# Patient Record
Sex: MALE | Race: WHITE | NOT HISPANIC OR LATINO | Employment: OTHER | URBAN - NONMETROPOLITAN AREA
[De-identification: names, ages, dates, MRNs, and addresses within clinical notes are randomized per-mention and may not be internally consistent; named-entity substitution may affect disease eponyms.]

---

## 2019-05-05 ENCOUNTER — HOSPITAL ENCOUNTER (INPATIENT)
Facility: OTHER | Age: 69
LOS: 2 days | Discharge: HOME | DRG: 920 | End: 2019-05-07
Attending: FAMILY MEDICINE | Admitting: FAMILY MEDICINE
Payer: COMMERCIAL

## 2019-05-05 DIAGNOSIS — I10 ESSENTIAL HYPERTENSION, BENIGN: Primary | ICD-10-CM

## 2019-05-05 DIAGNOSIS — K62.5 RECTAL BLEEDING: ICD-10-CM

## 2019-05-05 DIAGNOSIS — Z79.01 LONG TERM (CURRENT) USE OF ANTICOAGULANTS: ICD-10-CM

## 2019-05-05 DIAGNOSIS — I48.20 CHRONIC ATRIAL FIBRILLATION (H): ICD-10-CM

## 2019-05-05 DIAGNOSIS — K92.2 ACUTE GASTROINTESTINAL HEMORRHAGE: ICD-10-CM

## 2019-05-05 DIAGNOSIS — Z79.01 ANTICOAGULATED ON COUMADIN: ICD-10-CM

## 2019-05-05 PROBLEM — D62 ANEMIA DUE TO BLOOD LOSS, ACUTE: Status: ACTIVE | Noted: 2019-05-05

## 2019-05-05 LAB
ABO + RH BLD: NORMAL
ABO + RH BLD: NORMAL
ALBUMIN SERPL-MCNC: 3.9 G/DL (ref 3.5–5.7)
ALP SERPL-CCNC: 47 U/L (ref 34–104)
ALT SERPL W P-5'-P-CCNC: 14 U/L (ref 7–52)
ANION GAP SERPL CALCULATED.3IONS-SCNC: 1 MMOL/L (ref 3–14)
ANION GAP SERPL CALCULATED.3IONS-SCNC: 4 MMOL/L (ref 3–14)
APTT PPP: 35 SEC (ref 29–50)
AST SERPL W P-5'-P-CCNC: 14 U/L (ref 13–39)
BASOPHILS # BLD AUTO: 0.1 10E9/L (ref 0–0.2)
BASOPHILS NFR BLD AUTO: 0.7 %
BILIRUB SERPL-MCNC: 0.7 MG/DL (ref 0.3–1)
BLD GP AB SCN SERPL QL: NORMAL
BLD PROD TYP BPU: NORMAL
BLD PROD TYP BPU: NORMAL
BLD UNIT ID BPU: 0
BLOOD BANK CMNT PATIENT-IMP: NORMAL
BLOOD PRODUCT CODE: NORMAL
BPU ID: NORMAL
BUN SERPL-MCNC: 13 MG/DL (ref 7–25)
BUN SERPL-MCNC: 14 MG/DL (ref 7–25)
CALCIUM SERPL-MCNC: 7.2 MG/DL (ref 8.6–10.3)
CALCIUM SERPL-MCNC: 9 MG/DL (ref 8.6–10.3)
CHLORIDE SERPL-SCNC: 103 MMOL/L (ref 98–107)
CHLORIDE SERPL-SCNC: 113 MMOL/L (ref 98–107)
CO2 SERPL-SCNC: 23 MMOL/L (ref 21–31)
CO2 SERPL-SCNC: 25 MMOL/L (ref 21–31)
CREAT SERPL-MCNC: 0.66 MG/DL (ref 0.7–1.3)
CREAT SERPL-MCNC: 0.73 MG/DL (ref 0.7–1.3)
DIFFERENTIAL METHOD BLD: NORMAL
EOSINOPHIL # BLD AUTO: 0.2 10E9/L (ref 0–0.7)
EOSINOPHIL NFR BLD AUTO: 2.5 %
ERYTHROCYTE [DISTWIDTH] IN BLOOD BY AUTOMATED COUNT: 13.6 % (ref 10–15)
GFR SERPL CREATININE-BSD FRML MDRD: >90 ML/MIN/{1.73_M2}
GFR SERPL CREATININE-BSD FRML MDRD: >90 ML/MIN/{1.73_M2}
GLUCOSE SERPL-MCNC: 119 MG/DL (ref 70–105)
GLUCOSE SERPL-MCNC: 148 MG/DL (ref 70–105)
HCT VFR BLD AUTO: 42.2 % (ref 40–53)
HEMOCCULT STL QL: POSITIVE
HGB BLD-MCNC: 11.4 G/DL (ref 13.3–17.7)
HGB BLD-MCNC: 14.2 G/DL (ref 13.3–17.7)
HGB BLD-MCNC: 8.2 G/DL (ref 13.3–17.7)
HGB BLD-MCNC: 9.6 G/DL (ref 13.3–17.7)
HGB BLD-MCNC: 9.7 G/DL (ref 13.3–17.7)
IMM GRANULOCYTES # BLD: 0 10E9/L (ref 0–0.4)
IMM GRANULOCYTES NFR BLD: 0.3 %
INR PPP: 1.66 (ref 0–1.3)
LYMPHOCYTES # BLD AUTO: 2 10E9/L (ref 0.8–5.3)
LYMPHOCYTES NFR BLD AUTO: 28 %
MCH RBC QN AUTO: 32.1 PG (ref 26.5–33)
MCHC RBC AUTO-ENTMCNC: 33.6 G/DL (ref 31.5–36.5)
MCV RBC AUTO: 96 FL (ref 78–100)
MONOCYTES # BLD AUTO: 0.7 10E9/L (ref 0–1.3)
MONOCYTES NFR BLD AUTO: 10.1 %
NEUTROPHILS # BLD AUTO: 4.2 10E9/L (ref 1.6–8.3)
NEUTROPHILS NFR BLD AUTO: 58.4 %
NUM BPU REQUESTED: 2
PLATELET # BLD AUTO: 184 10E9/L (ref 150–450)
POTASSIUM SERPL-SCNC: 3.9 MMOL/L (ref 3.5–5.1)
POTASSIUM SERPL-SCNC: 4.2 MMOL/L (ref 3.5–5.1)
PROT SERPL-MCNC: 6.2 G/DL (ref 6.4–8.9)
RBC # BLD AUTO: 4.42 10E12/L (ref 4.4–5.9)
SODIUM SERPL-SCNC: 132 MMOL/L (ref 134–144)
SODIUM SERPL-SCNC: 137 MMOL/L (ref 134–144)
SPECIMEN EXP DATE BLD: NORMAL
TRANSFUSION STATUS PATIENT QL: NORMAL
TRANSFUSION STATUS PATIENT QL: NORMAL
WBC # BLD AUTO: 7.1 10E9/L (ref 4–11)

## 2019-05-05 PROCEDURE — 99285 EMERGENCY DEPT VISIT HI MDM: CPT | Mod: Z6 | Performed by: FAMILY MEDICINE

## 2019-05-05 PROCEDURE — 36415 COLL VENOUS BLD VENIPUNCTURE: CPT | Performed by: FAMILY MEDICINE

## 2019-05-05 PROCEDURE — 99285 EMERGENCY DEPT VISIT HI MDM: CPT | Mod: 25 | Performed by: FAMILY MEDICINE

## 2019-05-05 PROCEDURE — 99222 1ST HOSP IP/OBS MODERATE 55: CPT | Mod: AI | Performed by: FAMILY MEDICINE

## 2019-05-05 PROCEDURE — 80048 BASIC METABOLIC PNL TOTAL CA: CPT | Performed by: FAMILY MEDICINE

## 2019-05-05 PROCEDURE — 12000000 ZZH R&B MED SURG/OB

## 2019-05-05 PROCEDURE — 86901 BLOOD TYPING SEROLOGIC RH(D): CPT | Performed by: FAMILY MEDICINE

## 2019-05-05 PROCEDURE — C9113 INJ PANTOPRAZOLE SODIUM, VIA: HCPCS | Performed by: FAMILY MEDICINE

## 2019-05-05 PROCEDURE — 93010 ELECTROCARDIOGRAM REPORT: CPT | Performed by: INTERNAL MEDICINE

## 2019-05-05 PROCEDURE — 85610 PROTHROMBIN TIME: CPT | Performed by: FAMILY MEDICINE

## 2019-05-05 PROCEDURE — 86900 BLOOD TYPING SEROLOGIC ABO: CPT | Performed by: FAMILY MEDICINE

## 2019-05-05 PROCEDURE — 86850 RBC ANTIBODY SCREEN: CPT | Performed by: FAMILY MEDICINE

## 2019-05-05 PROCEDURE — 96361 HYDRATE IV INFUSION ADD-ON: CPT | Performed by: FAMILY MEDICINE

## 2019-05-05 PROCEDURE — 30233N1 TRANSFUSION OF NONAUTOLOGOUS RED BLOOD CELLS INTO PERIPHERAL VEIN, PERCUTANEOUS APPROACH: ICD-10-PCS | Performed by: FAMILY MEDICINE

## 2019-05-05 PROCEDURE — 82272 OCCULT BLD FECES 1-3 TESTS: CPT | Performed by: FAMILY MEDICINE

## 2019-05-05 PROCEDURE — 96374 THER/PROPH/DIAG INJ IV PUSH: CPT | Performed by: FAMILY MEDICINE

## 2019-05-05 PROCEDURE — 25000128 H RX IP 250 OP 636: Performed by: FAMILY MEDICINE

## 2019-05-05 PROCEDURE — P9016 RBC LEUKOCYTES REDUCED: HCPCS | Performed by: FAMILY MEDICINE

## 2019-05-05 PROCEDURE — 25800030 ZZH RX IP 258 OP 636: Performed by: FAMILY MEDICINE

## 2019-05-05 PROCEDURE — 80053 COMPREHEN METABOLIC PANEL: CPT | Performed by: FAMILY MEDICINE

## 2019-05-05 PROCEDURE — 96375 TX/PRO/DX INJ NEW DRUG ADDON: CPT | Performed by: FAMILY MEDICINE

## 2019-05-05 PROCEDURE — 86920 COMPATIBILITY TEST SPIN: CPT | Mod: 91 | Performed by: FAMILY MEDICINE

## 2019-05-05 PROCEDURE — 93005 ELECTROCARDIOGRAM TRACING: CPT | Performed by: FAMILY MEDICINE

## 2019-05-05 PROCEDURE — 85025 COMPLETE CBC W/AUTO DIFF WBC: CPT | Performed by: FAMILY MEDICINE

## 2019-05-05 PROCEDURE — G0378 HOSPITAL OBSERVATION PER HR: HCPCS

## 2019-05-05 PROCEDURE — 85018 HEMOGLOBIN: CPT | Performed by: FAMILY MEDICINE

## 2019-05-05 PROCEDURE — 85730 THROMBOPLASTIN TIME PARTIAL: CPT | Performed by: FAMILY MEDICINE

## 2019-05-05 PROCEDURE — 86920 COMPATIBILITY TEST SPIN: CPT | Performed by: FAMILY MEDICINE

## 2019-05-05 RX ORDER — WARFARIN SODIUM 5 MG/1
TABLET ORAL
Status: ON HOLD | COMMUNITY
Start: 2018-09-13 | End: 2019-05-07

## 2019-05-05 RX ORDER — ONDANSETRON 4 MG/1
4 TABLET, ORALLY DISINTEGRATING ORAL EVERY 6 HOURS PRN
Status: DISCONTINUED | OUTPATIENT
Start: 2019-05-05 | End: 2019-05-07 | Stop reason: HOSPADM

## 2019-05-05 RX ORDER — ACETAMINOPHEN 650 MG/1
650 SUPPOSITORY RECTAL EVERY 4 HOURS PRN
Status: DISCONTINUED | OUTPATIENT
Start: 2019-05-05 | End: 2019-05-06

## 2019-05-05 RX ORDER — ACETAMINOPHEN 325 MG/1
650 TABLET ORAL EVERY 4 HOURS PRN
Status: DISCONTINUED | OUTPATIENT
Start: 2019-05-05 | End: 2019-05-07 | Stop reason: HOSPADM

## 2019-05-05 RX ORDER — NALOXONE HYDROCHLORIDE 0.4 MG/ML
.1-.4 INJECTION, SOLUTION INTRAMUSCULAR; INTRAVENOUS; SUBCUTANEOUS
Status: DISCONTINUED | OUTPATIENT
Start: 2019-05-05 | End: 2019-05-06 | Stop reason: CLARIF

## 2019-05-05 RX ORDER — SIMVASTATIN 20 MG
20 TABLET ORAL AT BEDTIME
COMMUNITY
Start: 2018-07-26 | End: 2024-02-15

## 2019-05-05 RX ORDER — CARVEDILOL 3.12 MG/1
3.12 TABLET ORAL 2 TIMES DAILY WITH MEALS
Status: DISCONTINUED | OUTPATIENT
Start: 2019-05-05 | End: 2019-05-05

## 2019-05-05 RX ORDER — SODIUM CHLORIDE 9 MG/ML
INJECTION, SOLUTION INTRAVENOUS CONTINUOUS
Status: DISCONTINUED | OUTPATIENT
Start: 2019-05-05 | End: 2019-05-06

## 2019-05-05 RX ORDER — SILDENAFIL 50 MG/1
50 TABLET, FILM COATED ORAL
Status: ON HOLD | COMMUNITY
Start: 2015-02-13 | End: 2019-05-05

## 2019-05-05 RX ORDER — ONDANSETRON 2 MG/ML
4 INJECTION INTRAMUSCULAR; INTRAVENOUS EVERY 6 HOURS PRN
Status: DISCONTINUED | OUTPATIENT
Start: 2019-05-05 | End: 2019-05-07 | Stop reason: HOSPADM

## 2019-05-05 RX ORDER — LISINOPRIL 10 MG/1
TABLET ORAL
Status: ON HOLD | COMMUNITY
Start: 2018-09-03 | End: 2019-05-07

## 2019-05-05 RX ORDER — CARVEDILOL 3.12 MG/1
TABLET ORAL
Status: ON HOLD | COMMUNITY
Start: 2019-02-27 | End: 2019-05-07

## 2019-05-05 RX ADMIN — SODIUM CHLORIDE 1000 ML: 9 INJECTION, SOLUTION INTRAVENOUS at 06:56

## 2019-05-05 RX ADMIN — SODIUM CHLORIDE: 9 INJECTION, SOLUTION INTRAVENOUS at 20:35

## 2019-05-05 RX ADMIN — SODIUM CHLORIDE 1000 ML: 9 INJECTION, SOLUTION INTRAVENOUS at 18:16

## 2019-05-05 RX ADMIN — SODIUM CHLORIDE: 9 INJECTION, SOLUTION INTRAVENOUS at 11:38

## 2019-05-05 RX ADMIN — SODIUM CHLORIDE 500 ML: 9 INJECTION, SOLUTION INTRAVENOUS at 16:30

## 2019-05-05 RX ADMIN — PANTOPRAZOLE SODIUM 40 MG: 40 INJECTION, POWDER, FOR SOLUTION INTRAVENOUS at 06:56

## 2019-05-05 RX ADMIN — SODIUM CHLORIDE: 9 INJECTION, SOLUTION INTRAVENOUS at 15:30

## 2019-05-05 ASSESSMENT — ENCOUNTER SYMPTOMS
LIGHT-HEADEDNESS: 1
VOMITING: 0
ABDOMINAL PAIN: 0
HEMATURIA: 0
BACK PAIN: 0
SORE THROAT: 0
DYSURIA: 0
SHORTNESS OF BREATH: 0
FEVER: 0
NAUSEA: 0
FATIGUE: 0
COUGH: 0
DIARRHEA: 0
ANAL BLEEDING: 1
STRIDOR: 0
PALPITATIONS: 0
BLOOD IN STOOL: 1
BRUISES/BLEEDS EASILY: 1

## 2019-05-05 ASSESSMENT — MIFFLIN-ST. JEOR: SCORE: 1760.55

## 2019-05-05 NOTE — ED NOTES
Up to bathroom, had small stool with BRB in toilet. Steady with ambulation, lying in bed. Provider updated. Martha Mccarty RN on 5/5/2019 at 7:58 AM

## 2019-05-05 NOTE — PROGRESS NOTES
Chart accessed pending admission to [facility]. Patient assigned to room 334 with caregiver Isaiah Cheri Ospina RN on 5/5/2019 at 8:54 AM

## 2019-05-05 NOTE — H&P
[facility]    History and Physical  Hospitalist       Date of Admission:  5/5/2019    Assessment & Plan   Juan C Troncoso is a 69 year old male anticoagulated for a-fib who presents with lower GI bleeding following colonoscopy.    Principal Problem:    Acute lower GI bleeding    Assessment: colonoscopy on 4/29, back on warfarin and aspirin since.     Plan: Observation status at this time pending future hemoglobin and frequency of BM   Serial hemoglobin every 4 hours given frequency of stools   Avoid vitamin K for now, but if significant bleeding, then may use   IVF support    Active Problems:    Atrial fibrillation (H)    Assessment: chronic, rate controlled and anti-coagulated. INR 1.66    Plan: holding warfarin   Reduced carvedilol dose in AM, taking 3.125 mg BID and holding lisinopril in case of hypotension with blood loss      Cardiomyopathy (H)    Assessment: EF on ECHO through [facility] in Nov 2013 was normal        DVT Prophylaxis: Low Risk/Ambulatory with no VTE prophylaxis indicated  Code Status: No Order    Ignacio Rincon    Primary Care Physician   Gin Solitario    Chief Complaint   Bloody stools    History is obtained from the patient and chart review.    History of Present Illness   Juan C Troncoso is a 69 year old male on aspirin and warfarin who presents with bloody bowel movements today.  He had a colonoscopy with Dr. Layton on April 29, which was 6 days ago.  He started back on his warfarin and aspirin after the procedure.  Has been doing well until this morning when he had right blood in his stool.  He has now had 7 bloody bowel movements between home, the ER, and on admission to the hospital.  His last bowel movement was only blood, no stool.  He is starting to feel little lightheaded.  Hemoglobin in the ER was normal at 14.2. INR 1.66. Denies any chest pain or shortness of breath.  Generally health has been stable.  He was feeling well up until the bleeding started.    Past  Medical History    I have reviewed this patient's medical history and updated it with pertinent information if needed.   Past Medical History:   Diagnosis Date     Alcoholic cardiomyopathy (H)     Cardioverted.  Subsequently started on Amiodarone, Coumadin and Lisinopril.  Due for repeat echocardiography and cardiology follow-up 9/05.     Atrial fibrillation (H)     Atrial fibrillation possibly related to alcoholic cardiomyopathy.  Had a consultation with Dr. Henriquez on 02/28/05 and was advised to totally abstain from alcohol consumption.     Chest pain     6/05,Hospitalization with chest pain 6/05.  Left [facility] against medical advice.  Subsequently followed up with cardiology at [facility].  Underwent coronary angiography revealing no evidence of coronary artery stenosis.     Disorder of thyroid     Thyroid hormone studies normal.     Other personal history presenting hazards to health     revealed minimal left ventricular hypertrophy without valvular disease.     Tobacco use disorder     Dr. Henriquez advised smoking cessation consideration of a stress electrocardiogram because of a family history of coronary disease and longstanding history of cigarette abuse.       Past Surgical History   I have reviewed this patient's surgical history and updated it with pertinent information if needed.  Past Surgical History:   Procedure Laterality Date     ECHOCARDIOGRAM INTRAOPERATIVE IN OR      revealed minimal left ventricular hypertrophy without valvular disease.     OTHER SURGICAL HISTORY      208850,CHEST TUBE INSERTION,Previous chest tube placement     TONSILLECTOMY      No Comments Provided       Prior to Admission Medications   Prior to Admission Medications   Prescriptions Last Dose Informant Patient Reported? Taking?   aspirin (ASPIRIN) 81 MG EC tablet 5/4/2019 at am Self Yes Yes   Sig: Take 81 mg by mouth daily    carvedilol (COREG) 3.125 MG tablet 5/4/2019 at pm Self Yes Yes   Sig: TAKE 2  "TABLETS IN THE MORNING  AND TAKE 1 TABLET IN THE EVENING   lisinopril (PRINIVIL/ZESTRIL) 10 MG tablet 2019 at am Self Yes Yes   Sig: TAKE 1 TABLET EVERY DAY   simvastatin (ZOCOR) 20 MG tablet 2019 at pm Self Yes Yes   Sig: Take 20 mg by mouth At Bedtime    warfarin (COUMADIN) 5 MG tablet 2019 at am Self Yes Yes   Sig: Take 2 tablets on Tuesday and Saturday and take 1 1/2 tablets all the rest of the days of the week.      Facility-Administered Medications: None     Allergies   No Known Allergies    Social History   I have reviewed this patient's social history and updated it with pertinent information if needed. Juan C Troncoso      Family History   I have reviewed this patient's family history and updated it with pertinent information if needed.   Family History   Problem Relation Age of Onset     Other - See Comments Mother         COPD     Family History Negative Father         Good Health,A & W @ 78     Heart Disease Other         Heart Disease,Was an Olympic runner and had \"athlete's heart.\"  He apparently  of heart problems at a young age.     Cancer No family hx of         Cancer       Review of Systems     REVIEW OF SYSTEMS:    Eyes: Denies problems  Ears/Nose/Throat: Denies problems  Respiratory: Denies problems  Genitourinary: Denies problems  Musculoskeletal: Denies problems  Skin: Denies problems  Neurologic: Denies problems  Psychiatric: Denies problems      Physical Exam   Temp: 98.4  F (36.9  C) Temp src: Tympanic BP: 114/71 Pulse: 83 Heart Rate: 76 Resp: 16 SpO2: 95 % O2 Device: None (Room air)    Vital Signs with Ranges  Temp:  [96.2  F (35.7  C)-98.4  F (36.9  C)] 98.4  F (36.9  C)  Pulse:  [] 83  Heart Rate:  [60-81] 76  Resp:  [13-20] 16  BP: (114-184)/() 114/71  SpO2:  [94 %-99 %] 95 %  210 lbs 0 oz    General Appearance: Pleasant, alert, appropriate appearance for age. No acute distress  Eyes: EOMI, PERRL, no conjunctival injection  OroPharynx Exam: Normal " pharynx.  Neck Exam: Supple, no masses or nodes.  Chest/Respiratory Exam: Normal chest wall and respirations. Clear to auscultation.  Cardiovascular Exam: Regular rate and rhythm. S1, S2, no murmur, click, gallop, or rubs.  Gastrointestinal Exam: Soft, nontender, no abnormal masses or organomegaly.  Extremities: 2+ pedal pulses.  No lower extremity edema.  Neuro Exam: Alert, oriented x 3. CN II-XI intact. Strength symmetric upper and lower extremities  Psychiatric: Normal affect and mentation    Data   Data reviewed today:  I personally reviewed the EKG tracing showing a-fib controlled rate, no concerning ST changes.  Recent Labs   Lab 05/05/19  1141 05/05/19  0650   WBC  --  7.1   HGB 11.4* 14.2   MCV  --  96   PLT  --  184   INR  --  1.66*   NA  --  132*   POTASSIUM  --  3.9   CHLORIDE  --  103   CO2  --  25   BUN  --  14   CR  --  0.73   ANIONGAP  --  4   NOMAN  --  9.0   GLC  --  148*   ALBUMIN  --  3.9   PROTTOTAL  --  6.2*   BILITOTAL  --  0.7   ALKPHOS  --  47   ALT  --  14   AST  --  14       No results found for this or any previous visit (from the past 24 hour(s)).

## 2019-05-05 NOTE — PROGRESS NOTES
Pt reports feeling very dizzy and faint when returning to bed after last BM. Stools continue to be bloody. Will notify MD of pt's condition.

## 2019-05-05 NOTE — PROGRESS NOTES
Pt alert and orientated. SBA with transfers. Using bedside commode due to being dizzy. 's-140's. HR 70's. Afebrile. Lung sounds clear. Pt has had 5 bloody stools since admission to Medical floor, total of 11 BM since this AM. Two 500 ml bolus given for dizziness. Pt reports improvement in symptoms after interventions. Last Hgb 9.6 MD aware, decrease from 14.2 in ER. Reports no problems with voiding. Left PIV  ml/hr.

## 2019-05-05 NOTE — PHARMACY-ADMISSION MEDICATION HISTORY
Pharmacy -- Admission Medication Reconciliation    Prior to admission (PTA) medications were reviewed and the patient's PTA medication list was updated.    Sources Consulted: Patient, Care Everywhere  [facility] closed at time of note    The reliability of this Medication Reconciliation is: Reliability: Borderline reliable    The following significant changes were made:  1. Removed Viagra - per patient    Recent warfarin dosing per patient:  Patient states he HELD his warfarin prior to his colonoscopy, then he restarted with increased dosing on 4/29 and 5/1, then he resumed his usual dosing of 10 mg on Tuesday and Saturdays then 7.5 mg rest of the week.     4/29/19: 12.5 mg  4/30/10: 12.5 mg  5/1/19: 7.5 mg  5/2/19: 7.5 mg  5/3/19: 7.5 mg  5/4/19: 10 mg      In addition, the patient's allergies were reviewed with the patient and updated as follows:   Allergies: Patient has no known allergies.    The pharmacist has reviewed with the patient that all personal medications should be removed from the building or locked in the belongings safe.  Patient shall only take medications ordered by the physician and administered by the nursing staff.     Medication barriers identified: Patient needed significant prompting during interview.    Medication adherence concerns: NA   Understanding of emergency medications: NA    Ellie Scott RPH, 5/5/2019,  4:28 PM     Verified refill history with [facility]; no significant discrepancies; however simvastatin has not been filled since October 2018. Unable to verify if patient has been taking this.     Added route to carvedilol, lisinopril, and warfarin.    [facility] refill history also pending (no records showing up in [employer]).  Pa Fajardo RPH on 5/7/2019 at 9:15 AM

## 2019-05-05 NOTE — ED NOTES
Patient watching observation status video and handout given. Martha Mccarty RN on 5/5/2019 at 8:57 AM

## 2019-05-05 NOTE — ED TRIAGE NOTES
"Pt reports 6 days ago he had a colonoscopy and had a few polyps removed. He is on Warfarin and Aspirin. He had a BM this morning and found blood in the toilet, says the toilet was really red when he looked at it. He has had daily morning BMs since the colonoscopy without issue. He also says he feels dizzy and \"woozy\".  "

## 2019-05-05 NOTE — ED PROVIDER NOTES
History     Chief Complaint   Patient presents with     Rectal Bleeding     HPI  Juan C Troncoso is a 69 year old male who presents to ED with rectal bleeding that started this morning.   He states that he woke up this morning and had a bowel movement.  He noticed that there was a lot of bright red blood in the toilet water and on the tissue paper with wiping.  He states that he had a second bowel movement about an hour later and the same thing occurred.  He denies any diarrhea, melena, passing of clots.  He is not bleeding without having a bowel movement.  He denies any abdominal pain, nausea, vomiting.  He does state that he feels lightheaded and woozy.  He has not had any syncopal or near syncopal episodes.    Patient denies any fevers, sweats, chills, URI type symptoms, sore throat, cough, chest pain, shortness of breath, dysuria or hematuria.  He is on Coumadin.  His INRs have been therapeutic.  He states that he had a colonoscopy 6 days ago and had stopped his Coumadin for that but started it afterwards.    Allergies:  No Known Allergies    Problem List:    There are no active problems to display for this patient.       Past Medical History:    Past Medical History:   Diagnosis Date     Alcoholic cardiomyopathy (H)      Atrial fibrillation (H)      Chest pain      Disorder of thyroid      Other personal history presenting hazards to health      Tobacco use disorder        Past Surgical History:    Past Surgical History:   Procedure Laterality Date     ECHOCARDIOGRAM INTRAOPERATIVE IN OR      revealed minimal left ventricular hypertrophy without valvular disease.     OTHER SURGICAL HISTORY      208850,CHEST TUBE INSERTION,Previous chest tube placement     TONSILLECTOMY      No Comments Provided       Family History:    Family History   Problem Relation Age of Onset     Other - See Comments Mother         COPD     Family History Negative Father         Good Health,A & W @ 78     Heart Disease Other          "Heart Disease,Was an Olympic runner and had \"athlete's heart.\"  He apparently  of heart problems at a young age.     Cancer No family hx of         Cancer       Social History:  Marital Status:   [2]  Social History     Tobacco Use     Smoking status: Not on file   Substance Use Topics     Alcohol use: Not on file     Drug use: Not on file        Medications:      aspirin (ASPIRIN) 81 MG EC tablet   carvedilol (COREG) 3.125 MG tablet   lisinopril (PRINIVIL/ZESTRIL) 10 MG tablet   simvastatin (ZOCOR) 20 MG tablet   warfarin (COUMADIN) 5 MG tablet   sildenafil (VIAGRA) 50 MG tablet         Review of Systems   Constitutional: Negative for fatigue and fever.   HENT: Negative for congestion and sore throat.    Eyes: Negative for visual disturbance.   Respiratory: Negative for cough, shortness of breath and stridor.    Cardiovascular: Negative for chest pain, palpitations and leg swelling.   Gastrointestinal: Positive for anal bleeding and blood in stool. Negative for abdominal pain, diarrhea, nausea and vomiting.   Genitourinary: Negative for dysuria and hematuria.   Musculoskeletal: Negative for back pain.   Skin: Negative for pallor.   Neurological: Positive for light-headedness. Negative for syncope.   Hematological: Bruises/bleeds easily.   All other systems reviewed and are negative.      Physical Exam   BP: (!) 184/118  Pulse: 100  Heart Rate: 81  Temp: 96.2  F (35.7  C)  Resp: 16  Height: 182.9 cm (6')  Weight: 95.3 kg (210 lb)  SpO2: 95 %      Physical Exam   Constitutional: He is oriented to person, place, and time. He appears well-developed and well-nourished. He is cooperative. He does not appear ill. No distress.   HENT:   Head: Normocephalic and atraumatic.   Right Ear: External ear normal.   Left Ear: External ear normal.   Nose: Nose normal.   Mouth/Throat: Oropharynx is clear and moist.   Eyes: Pupils are equal, round, and reactive to light. Conjunctivae and EOM are normal.   Neck: Normal range " of motion. Neck supple.   Cardiovascular: Normal rate, regular rhythm, normal heart sounds and intact distal pulses.   No murmur heard.  Pulmonary/Chest: Effort normal and breath sounds normal. He has no rales.   Abdominal: Soft. Bowel sounds are normal. He exhibits no distension. There is no hepatosplenomegaly. There is no tenderness. There is no rigidity, no rebound, no guarding, no CVA tenderness, no tenderness at McBurney's point and negative Casey's sign.   Genitourinary: Prostate normal. Rectal exam shows external hemorrhoid and guaiac positive stool. Rectal exam shows no tenderness.   Genitourinary Comments: Андрей Mistry RN is present in the room for the rectal examination.    There is a small amount of gross bright red blood present on my examination.  There are some external hemorrhoids that are not thrombosed.   Musculoskeletal: Normal range of motion. He exhibits no edema or tenderness.   Lymphadenopathy:     He has no cervical adenopathy.   Neurological: He is alert and oriented to person, place, and time.   Skin: Skin is warm. No rash noted. He is not diaphoretic.   Nursing note and vitals reviewed.    PATHOLOGY SPEC (04/29/2019 12:02 PM CDT)  PATHOLOGY SPEC (04/29/2019 12:02 PM CDT)   Component Value Ref Range Performed At Pathologist Signature   Case Report Surgical Pathology Report                         Case: [id number]                                 Authorizing Provider:  Maged Layton MD        Collected:           04/29/2019 1202              Ordering Location:     [facility]     Received:            04/29/2019 1927              Pathologist:           France Escobar MD                                                         Specimens:   A) - Large Intestine Right/Ascending Colon, Ascending colon (proximal)                              B) - Large Intestine Right/Ascending Colon, Mid ascending colon                                     C) - Large Intestine Transverse Colon,  "Polyp @ proximal transverse @ hepatic flexure                D) - Large Intestine Sigmoid Colon, Polyp @ sigmoid colon @ 20cm                        [facility]     Final Dx A. Ascending colon, proximal polyp, biopsy:  Fragments of tubular adenoma  B. Mid ascending colon, polyp, biopsy:  Tubular adenoma  C. Proximal transverse colon at hepatic flexure, polyp, biopsy:  Fragments of tubular adenoma  D. Sigmoid colon, polyp at 20 cm, biopsy:  Hyperplastic polyp   [facility] Electronically signed by France Escobar MD on 5/1/2019 at 11:53 AM   Gross Description Received are four containers, specimens in formalin, labeled with proper patient identification.   A. Designated \"ascending colon proximal\" are 3 pieces of tan soft tissue, each 0.2 cm. Entirely submitted in A1.  B. Designated \"midascending colon\" is a 0.3 cm piece of pink-tan soft tissue, which is entirely submitted in B1.  C. Designated \"polyp at proximal transverse at hepatic flexure\" are approximately 3 pieces of tan soft tissue, each 0.1 cm. Entirely submitted in C1.  D. Designated \"polyp at sigmoid colon at 20 cm\" is a 0.3 cm piece of pink-tan polyploid tissue, which is admixed with debris and entirely submitted in D1.  MJG   [facility]     Microscopic Microscopic examination performed.    [facility]       PATHOLOGY SPEC (04/29/2019 12:02 PM CDT)   Specimen   Tissue - Large Intestine Right/Ascending Colon     PATHOLOGY SPEC (04/29/2019 12:02 PM CDT)   Performing Organization Address City/State/Zipcode Phone Number   [facility]   [address] [place] [address]            ED Course     Orders Placed This Encounter   Procedures     CBC with platelets differential     Comprehensive metabolic panel     INR     Partial thromboplastin time     Occult blood stool     EKG 12-lead, tracing only     Cardiac Continuous Monitoring     Peripheral IV: Standard     ABO/Rh type and " screen     ED Bed Request     Procedures     Critical Care time:  none  Results for orders placed or performed during the hospital encounter of 05/05/19 (from the past 24 hour(s))   Occult blood stool   Result Value Ref Range    Occult Blood Positive (A) NEG^Negative   CBC with platelets differential   Result Value Ref Range    WBC 7.1 4.0 - 11.0 10e9/L    RBC Count 4.42 4.4 - 5.9 10e12/L    Hemoglobin 14.2 13.3 - 17.7 g/dL    Hematocrit 42.2 40.0 - 53.0 %    MCV 96 78 - 100 fl    MCH 32.1 26.5 - 33.0 pg    MCHC 33.6 31.5 - 36.5 g/dL    RDW 13.6 10.0 - 15.0 %    Platelet Count 184 150 - 450 10e9/L    Diff Method Automated Method     % Neutrophils 58.4 %    % Lymphocytes 28.0 %    % Monocytes 10.1 %    % Eosinophils 2.5 %    % Basophils 0.7 %    % Immature Granulocytes 0.3 %    Absolute Neutrophil 4.2 1.6 - 8.3 10e9/L    Absolute Lymphocytes 2.0 0.8 - 5.3 10e9/L    Absolute Monocytes 0.7 0.0 - 1.3 10e9/L    Absolute Eosinophils 0.2 0.0 - 0.7 10e9/L    Absolute Basophils 0.1 0.0 - 0.2 10e9/L    Abs Immature Granulocytes 0.0 0 - 0.4 10e9/L   Comprehensive metabolic panel   Result Value Ref Range    Sodium 132 (L) 134 - 144 mmol/L    Potassium 3.9 3.5 - 5.1 mmol/L    Chloride 103 98 - 107 mmol/L    Carbon Dioxide 25 21 - 31 mmol/L    Anion Gap 4 3 - 14 mmol/L    Glucose 148 (H) 70 - 105 mg/dL    Urea Nitrogen 14 7 - 25 mg/dL    Creatinine 0.73 0.70 - 1.30 mg/dL    GFR Estimate >90 >60 mL/min/[1.73_m2]    GFR Estimate If Black >90 >60 mL/min/[1.73_m2]    Calcium 9.0 8.6 - 10.3 mg/dL    Bilirubin Total 0.7 0.3 - 1.0 mg/dL    Albumin 3.9 3.5 - 5.7 g/dL    Protein Total 6.2 (L) 6.4 - 8.9 g/dL    Alkaline Phosphatase 47 34 - 104 U/L    ALT 14 7 - 52 U/L    AST 14 13 - 39 U/L   INR   Result Value Ref Range    INR 1.66 (H) 0 - 1.3   Partial thromboplastin time   Result Value Ref Range    PTT 35 29 - 50 sec   ABO/Rh type and screen   Result Value Ref Range    ABO A     RH(D) Pos     Antibody Screen Neg     Test Valid Only At  [facility]        Specimen Expires 05/08/2019        Medications   pantoprazole (PROTONIX) 40 mg IV push injection (40 mg Intravenous Given 5/5/19 5923)   0.9% sodium chloride BOLUS (0 mLs Intravenous Stopped 5/5/19 3566)     The patient is checked out of the routine change of shift to Dr. Carpenter with labs pending.    7:00 AM sign out from Dr. Urias this morning.  I have met the patient after his blood draw and he's feeling well right now; no pain/cramping.  Discussed pending labs and I will review results when completed.  Fernando Carpenter MD.    8:02 AM patient had another bright bloody stool.  Initial labs are reassuring with subtherapeutic INR.  He is in rate controlled afib.  Fernando Carpenter MD.    8:32 AM Patient up and has another bright bloody stool, he looks a little pale walking but appears fine laying back in bed.  He denies any SOB or CP.  I discussed with him concerns that he will need monitoring and serial hemoglobins due to ongoing bleeding.  We reviewed indications for transfusion if bleeding worsens/continues.  Fernando Carpenter MD.    Assessments & Plan (with Medical Decision Making)   69 year old male on coumadin for a-fib had colonoscopy with Dr. Layton Monday and some polyps removed.  He resumed his coumadin and 81mg daily aspirin Monday afternoon after his procedure.  This morning at 06:00 he had a bright bloody bowel movement and then had another prompting ED visit.  He has had two more bloody BMs since arrival to the ED.  Biopsy x 2 in ascending colon and once in transverse - all tubular adenomas.  And one biopsy in sigmoid colon - hyperplastic polyp.  His INR is 1.66.  He is hemodynamically stable.    I have reviewed the nursing notes.       Medication List      There are no discharge medications for this visit.         Final diagnoses:   Rectal bleeding - Lower GI bleeding.   Anticoagulated on Coumadin   Chronic atrial fibrillation (H)       5/5/2019   [facility]  [facility]     Pa Urias MD  05/05/19 0651       Fernando Carpenter MD  05/05/19 0845       Fernando Carpenter MD  05/05/19 0847

## 2019-05-06 PROBLEM — Z72.0 TOBACCO ABUSE: Status: ACTIVE | Noted: 2019-05-06

## 2019-05-06 LAB
ANION GAP SERPL CALCULATED.3IONS-SCNC: 3 MMOL/L (ref 3–14)
BUN SERPL-MCNC: 14 MG/DL (ref 7–25)
CALCIUM SERPL-MCNC: 7.4 MG/DL (ref 8.6–10.3)
CHLORIDE SERPL-SCNC: 113 MMOL/L (ref 98–107)
CO2 SERPL-SCNC: 22 MMOL/L (ref 21–31)
CREAT SERPL-MCNC: 0.63 MG/DL (ref 0.7–1.3)
ERYTHROCYTE [DISTWIDTH] IN BLOOD BY AUTOMATED COUNT: 14.8 % (ref 10–15)
GFR SERPL CREATININE-BSD FRML MDRD: >90 ML/MIN/{1.73_M2}
GLUCOSE SERPL-MCNC: 125 MG/DL (ref 70–105)
HCT VFR BLD AUTO: 26.9 % (ref 40–53)
HGB BLD-MCNC: 8.4 G/DL (ref 13.3–17.7)
HGB BLD-MCNC: 8.8 G/DL (ref 13.3–17.7)
HGB BLD-MCNC: NORMAL G/DL (ref 13.3–17.7)
INR PPP: 1.44 (ref 0–1.3)
MAGNESIUM SERPL-MCNC: 1.6 MG/DL (ref 1.9–2.7)
MCH RBC QN AUTO: 31.5 PG (ref 26.5–33)
MCHC RBC AUTO-ENTMCNC: 32.7 G/DL (ref 31.5–36.5)
MCV RBC AUTO: 96 FL (ref 78–100)
PLATELET # BLD AUTO: 136 10E9/L (ref 150–450)
POTASSIUM SERPL-SCNC: 4 MMOL/L (ref 3.5–5.1)
RBC # BLD AUTO: 2.79 10E12/L (ref 4.4–5.9)
SODIUM SERPL-SCNC: 138 MMOL/L (ref 134–144)
WBC # BLD AUTO: 7.1 10E9/L (ref 4–11)

## 2019-05-06 PROCEDURE — 25000132 ZZH RX MED GY IP 250 OP 250 PS 637: Performed by: INTERNAL MEDICINE

## 2019-05-06 PROCEDURE — 25800030 ZZH RX IP 258 OP 636: Performed by: FAMILY MEDICINE

## 2019-05-06 PROCEDURE — 80048 BASIC METABOLIC PNL TOTAL CA: CPT | Performed by: FAMILY MEDICINE

## 2019-05-06 PROCEDURE — 12000000 ZZH R&B MED SURG/OB

## 2019-05-06 PROCEDURE — 85018 HEMOGLOBIN: CPT | Performed by: FAMILY MEDICINE

## 2019-05-06 PROCEDURE — 99223 1ST HOSP IP/OBS HIGH 75: CPT | Mod: 25 | Performed by: SURGERY

## 2019-05-06 PROCEDURE — 99232 SBSQ HOSP IP/OBS MODERATE 35: CPT | Performed by: INTERNAL MEDICINE

## 2019-05-06 PROCEDURE — 85610 PROTHROMBIN TIME: CPT | Performed by: INTERNAL MEDICINE

## 2019-05-06 PROCEDURE — 36415 COLL VENOUS BLD VENIPUNCTURE: CPT | Performed by: FAMILY MEDICINE

## 2019-05-06 PROCEDURE — 36415 COLL VENOUS BLD VENIPUNCTURE: CPT | Performed by: INTERNAL MEDICINE

## 2019-05-06 PROCEDURE — 83735 ASSAY OF MAGNESIUM: CPT | Performed by: INTERNAL MEDICINE

## 2019-05-06 PROCEDURE — 85027 COMPLETE CBC AUTOMATED: CPT | Performed by: INTERNAL MEDICINE

## 2019-05-06 RX ORDER — LISINOPRIL 10 MG/1
10 TABLET ORAL DAILY
Status: DISCONTINUED | OUTPATIENT
Start: 2019-05-06 | End: 2019-05-07 | Stop reason: HOSPADM

## 2019-05-06 RX ORDER — NICOTINE 21 MG/24HR
1 PATCH, TRANSDERMAL 24 HOURS TRANSDERMAL DAILY PRN
Status: DISCONTINUED | OUTPATIENT
Start: 2019-05-06 | End: 2019-05-06 | Stop reason: CLARIF

## 2019-05-06 RX ORDER — NALOXONE HYDROCHLORIDE 0.4 MG/ML
.1-.4 INJECTION, SOLUTION INTRAMUSCULAR; INTRAVENOUS; SUBCUTANEOUS
Status: DISCONTINUED | OUTPATIENT
Start: 2019-05-06 | End: 2019-05-07 | Stop reason: HOSPADM

## 2019-05-06 RX ORDER — ATORVASTATIN CALCIUM 10 MG/1
10 TABLET, FILM COATED ORAL AT BEDTIME
Status: DISCONTINUED | OUTPATIENT
Start: 2019-05-06 | End: 2019-05-07 | Stop reason: HOSPADM

## 2019-05-06 RX ORDER — CARVEDILOL 3.12 MG/1
3.12 TABLET ORAL 2 TIMES DAILY WITH MEALS
Status: DISCONTINUED | OUTPATIENT
Start: 2019-05-06 | End: 2019-05-07 | Stop reason: HOSPADM

## 2019-05-06 RX ORDER — SIMVASTATIN 20 MG
20 TABLET ORAL AT BEDTIME
Status: DISCONTINUED | OUTPATIENT
Start: 2019-05-06 | End: 2019-05-06 | Stop reason: CLARIF

## 2019-05-06 RX ADMIN — LISINOPRIL 10 MG: 10 TABLET ORAL at 10:19

## 2019-05-06 RX ADMIN — SODIUM CHLORIDE: 9 INJECTION, SOLUTION INTRAVENOUS at 05:03

## 2019-05-06 RX ADMIN — CARVEDILOL 3.12 MG: 3.12 TABLET, FILM COATED ORAL at 18:21

## 2019-05-06 RX ADMIN — CARVEDILOL 3.12 MG: 3.12 TABLET, FILM COATED ORAL at 10:19

## 2019-05-06 RX ADMIN — ATORVASTATIN CALCIUM 10 MG: 10 TABLET, FILM COATED ORAL at 21:08

## 2019-05-06 ASSESSMENT — ACTIVITIES OF DAILY LIVING (ADL)
RETIRED_COMMUNICATION: 0-->UNDERSTANDS/COMMUNICATES WITHOUT DIFFICULTY
COGNITION: 0 - NO COGNITION ISSUES REPORTED
ADLS_ACUITY_SCORE: 21
BATHING: 2-->ASSISTIVE PERSON
FALL_HISTORY_WITHIN_LAST_SIX_MONTHS: NO
TOILETING: 2-->ASSISTIVE PERSON
ADLS_ACUITY_SCORE: 21
ADLS_ACUITY_SCORE: 21
AMBULATION: 2-->ASSISTIVE PERSON
TRANSFERRING: 2-->ASSISTIVE PERSON
WHICH_OF_THE_ABOVE_FUNCTIONAL_RISKS_HAD_A_RECENT_ONSET_OR_CHANGE?: AMBULATION
DRESS: 2-->ASSISTIVE PERSON
ADLS_ACUITY_SCORE: 21
ADLS_ACUITY_SCORE: 13
RETIRED_EATING: 0-->INDEPENDENT
SWALLOWING: 0-->SWALLOWS FOODS/LIQUIDS WITHOUT DIFFICULTY
ADLS_ACUITY_SCORE: 21

## 2019-05-06 ASSESSMENT — MIFFLIN-ST. JEOR: SCORE: 1821.34

## 2019-05-06 NOTE — PROGRESS NOTES
Pt experienced first bloody stool since 1930.  Volume was 250 ml, material was red rather than maroon as 1930 was.  Hgb at 0430 was 8.4.  Blood pressure remains adequate at 0525.

## 2019-05-06 NOTE — PROGRESS NOTES
[facility]    Hospitalist Progress Note      Assessment & Plan   Juan C Troncoso is a 69 year old male who was admitted on 5/5/2019.     Principal Problem:    Acute lower GI bleeding    Assessment: Improved, no further bright red blood per rectum, hemoglobin improving after blood transfusion yesterday, he underwent polypectomy with hot snared by Dr. Layton and colonoscopy results reviewed with no other AVMs or masses noted with colonoscopy.    Plan: - Continue to monitor hemoglobin   - advance to regular diet   - continue to allow Coumadin to trend down   - appreciate general surgery consult   - follow-up with Dr. Layton as outpatient    Active Problems:    Atrial fibrillation (H)    Assessment: Chronic rate controlled and anticoagulated.  Given his bleeding will hold Coumadin at this point    Plan: - continue coreg   - hold coumadin      Cardiomyopathy (H)    Assessment: Stable, most recent EF in the central records from 2013 normal    Plan: -ACE and beta-blocker      Anemia due to blood loss, acute    Assessment: Received 1 unit PRBC on 5/5 and improving    Plan: -Monitor    Tobacco abuse  Assessment: Ongoing 1 to 1-1/2 pack/day smoker, counseled to quit  Plan: He is agreeable to nicotine patch    FEN: regular diet, normal saline at 0 mL/hr  PPX: SCD's     Code Status: No Order    [place]    Interval History   Overnight no acute events and afebrile, feeling significantly improved, no further bright red blood per rectum, no abdominal pain nausea vomiting, shortness of breath, chest pain or palpitations, he is a 1 to 1/2 pack/day smoker, no other new complaints.    -Data reviewed today: I reviewed all new labs and imaging results over the last 24 hours. I personally reviewed no images or EKG's today.    Physical Exam   Temp: 98.6  F (37  C) Temp src: Tympanic BP: 126/72 Pulse: 72 Heart Rate: 85 Resp: 16 SpO2: 95 % O2 Device: None (Room air)    Vitals:    05/05/19 0650 05/06/19 0541   Weight:  95.3 kg (210 lb) 101.3 kg (223 lb 6.4 oz)     Vital Signs with Ranges  Temp:  [97.4  F (36.3  C)-98.6  F (37  C)] 98.6  F (37  C)  Pulse:  [] 72  Heart Rate:  [75-85] 85  Resp:  [16-20] 16  BP: ()/(46-89) 126/72  SpO2:  [93 %-99 %] 95 %  I/O last 3 completed shifts:  In: 4340 [P.O.:240; I.V.:2832; IV Piggyback:1000]  Out: 1850 [Urine:250; Stool:1600]    Exam:   GENERAL: Talkative, in no apparent distress.  CARDIOVASCULAR: Irregularly irregular rate and rhythm, no murmurs, rubs, or gallops. Normal S1/S2. No lower extremity edema.   RESPIRATORY: clear to auscultation bilaterally, no wheezes, no crackles.   GI: soft, non-tender deep palpation in all quadrants, non-distended, normoactive bowel sounds.  MUSCULOSKELETAL: warm and well perfused, 2+ dorsalis pedis pulses bilaterally.    SKIN: no pallor,jaundice, or rashes      Medications       sodium chloride 0.9%  500 mL Intravenous Once     atorvastatin  10 mg Oral At Bedtime     carvedilol  3.125 mg Oral BID w/meals     lisinopril  10 mg Oral Daily     nicotine   Transdermal Q8H     [START ON 5/7/2019] nicotine   Transdermal Daily       Data   Recent Labs   Lab 05/06/19  0810 05/06/19  0720 05/06/19  0430 05/05/19  2100  05/05/19  0650   WBC  --  7.1  --   --   --  7.1   HGB  --  8.8*  Canceled, Test credited 8.4* 8.2*   < > 14.2   MCV  --  96  --   --   --  96   PLT  --  136*  --   --   --  184   INR 1.44*  --   --   --   --  1.66*   NA  --   --  138 137  --  132*   POTASSIUM  --   --  4.0 4.2  --  3.9   CHLORIDE  --   --  113* 113*  --  103   CO2  --   --  22 23  --  25   BUN  --   --  14 13  --  14   CR  --   --  0.63* 0.66*  --  0.73   ANIONGAP  --   --  3 1*  --  4   NOMAN  --   --  7.4* 7.2*  --  9.0   GLC  --   --  125* 119*  --  148*   ALBUMIN  --   --   --   --   --  3.9   PROTTOTAL  --   --   --   --   --  6.2*   BILITOTAL  --   --   --   --   --  0.7   ALKPHOS  --   --   --   --   --  47   ALT  --   --   --   --   --  14   AST  --   --   --   --    --  14    < > = values in this interval not displayed.       No results found for this or any previous visit (from the past 24 hour(s)).

## 2019-05-06 NOTE — PROGRESS NOTES
Patient tolerated transfusion of one unit of blood with no difficulty.  As patient has had no stools since 1930, held 0100 scheduled hemoglobin and will have it drawn at 0500 as scheduled.  Blood pressure holding stable about 100 systolic.

## 2019-05-06 NOTE — PROGRESS NOTES
He's now had over 1 L of bloody stool documented since admission on 4 different stools. Last episode was only 100 mL of maroon stool. BM are spacing out and getting darker. However, he's now had 3 different episodes of BP in the 90s. No symptomatic laying in bed, but when sitting up has been lightheaded. Improved with IVF bolus. Hemoglobin dropped to 8.2 from 14.2. Elected to transfuse 1 unit PRBCs to give some safety margin with ongoing blood loss especially with hypotension that has been occurring. Made an admission. Continue serial hemoglobins.

## 2019-05-06 NOTE — CONSULTS
GENERAL SURGERY CONSULTATION NOTE    Juan C Troncoso   [address]  [facility] [address]  69 year old  male    Primary Care Provider:  Gin Solitario      HPI: Juan C Troncoso presented to the ED on 5/5/2019 with several massive bloody bowel movements.  The patient had a colonoscopy on 4/29/2019 that included for polypectomies.  The patient takes warfarin for chronic atrial fibrillation.  He did restart his warfarin after the colonoscopy, INR on admission is 1.66.  Patient had several bloody bowel movements at home and continued to have frequent bloody bowel movements here in the hospital.  He noted he felt dizzy and lightheaded at times.  His hemoglobin was down to 8.2 he was transfused 1 unit of packed red blood cells.  He says he feels a little bit better.  Overnight the patient was able to sleep without having to get up to have a bloody bowel movement.  He did have one maroon bowel movement this morning.  He denies abdominal pain.  He denies fevers or chills.  He denies bloating.  He has some cramping abdominal pain when he is about to have a bowel movement.   No history of lower GI bleed.  Risk factors for upper GI bleed include smoking and long-term anticoagulation use.      REVIEW OF SYSTEMS:    GENERAL: No fevers or chills. Denies fatigue, recent weight loss.  HEENT: No sinus drainage. No changes with vision or hearing. No difficulty swallowing.   LYMPHATICS:  No swollen nodes in axilla, neck or groin.  CARDIOVASCULAR: Denies chest pain, palpitations and dyspnea on exertion.  PULMONARY: No shortness of breath or cough. No increase in sputum production.  GI:  No hematemesis. No constipation or diarrhea.  No melena  : No dysuria or hematuria.  SKIN: No recent rashes or ulcers.   HEMATOLOGY:  No history of easy bruising or bleeding.  ENDOCRINE:  No history of diabetes or thyroid problems.  NEUROLOGY:  No history of seizures or headaches. No motor or sensory changes.        Patient Active Problem List  "  Diagnosis     Acute lower GI bleeding     Atrial fibrillation (H)     Cardiomyopathy (H)     Essential hypertension, benign     Anemia due to blood loss, acute     Tobacco abuse       Past Medical History:   Diagnosis Date     Alcoholic cardiomyopathy (H)     Cardioverted.  Subsequently started on Amiodarone, Coumadin and Lisinopril.  Due for repeat echocardiography and cardiology follow-up .     Atrial fibrillation (H)     Atrial fibrillation possibly related to alcoholic cardiomyopathy.  Had a consultation with Dr. Henriquez on 05 and was advised to totally abstain from alcohol consumption.     Chest pain     ,Hospitalization with chest pain .  Left [facility] against medical advice.  Subsequently followed up with cardiology at [facility].  Underwent coronary angiography revealing no evidence of coronary artery stenosis.     Disorder of thyroid     Thyroid hormone studies normal.     Other personal history presenting hazards to health     revealed minimal left ventricular hypertrophy without valvular disease.     Tobacco use disorder     Dr. Henriquez advised smoking cessation consideration of a stress electrocardiogram because of a family history of coronary disease and longstanding history of cigarette abuse.       Past Surgical History:   Procedure Laterality Date     ECHOCARDIOGRAM INTRAOPERATIVE IN OR      revealed minimal left ventricular hypertrophy without valvular disease.     OTHER SURGICAL HISTORY      ,CHEST TUBE INSERTION,Previous chest tube placement     TONSILLECTOMY      No Comments Provided       Family History   Problem Relation Age of Onset     Other - See Comments Mother         COPD     Family History Negative Father         Good Health,A & W @ 79     Heart Disease Other         Heart Disease,Was an Olympic runner and had \"athlete's heart.\"  He apparently  of heart problems at a young age.     Cancer No family hx of         Cancer       Social " History     Social History Narrative    Currently working for [employer], plans to drive for [employer].  p 10/28/2013.       Social History     Socioeconomic History     Marital status:      Spouse name: Not on file     Number of children: Not on file     Years of education: Not on file     Highest education level: Not on file   Occupational History     Not on file   Social Needs     Financial resource strain: Not on file     Food insecurity:     Worry: Not on file     Inability: Not on file     Transportation needs:     Medical: Not on file     Non-medical: Not on file   Tobacco Use     Smoking status: Not on file   Substance and Sexual Activity     Alcohol use: Not on file     Drug use: Not on file     Sexual activity: Not on file   Lifestyle     Physical activity:     Days per week: Not on file     Minutes per session: Not on file     Stress: Not on file   Relationships     Social connections:     Talks on phone: Not on file     Gets together: Not on file     Attends Bahai service: Not on file     Active member of club or organization: Not on file     Attends meetings of clubs or organizations: Not on file     Relationship status: Not on file     Intimate partner violence:     Fear of current or ex partner: Not on file     Emotionally abused: Not on file     Physically abused: Not on file     Forced sexual activity: Not on file   Other Topics Concern     Not on file   Social History Narrative    Currently working for [employer], plans to drive for [employer] and [employer].  p 10/28/2013.         No current facility-administered medications on file prior to encounter.   Current Outpatient Medications on File Prior to Encounter:  aspirin (ASPIRIN) 81 MG EC tablet Take 81 mg by mouth daily    carvedilol (COREG) 3.125 MG tablet TAKE 2 TABLETS IN THE MORNING  AND TAKE 1 TABLET IN THE EVENING   lisinopril (PRINIVIL/ZESTRIL) 10 MG tablet TAKE 1 TABLET EVERY DAY   simvastatin (ZOCOR) 20 MG tablet  Take 20 mg by mouth At Bedtime    warfarin (COUMADIN) 5 MG tablet Take 2 tablets on Tuesday and Saturday and take 1 1/2 tablets all the rest of the days of the week.         ALLERGIES/SENSITIVITIES: No Known Allergies    PHYSICAL EXAM:     /72   Pulse 72   Temp 98.6  F (37  C) (Tympanic)   Resp 16   Ht 1.829 m (6')   Wt 101.3 kg (223 lb 6.4 oz)   SpO2 95%   BMI 30.30 kg/m      General Appearance:   Sitting up in the chair, no apparent distress  HEENT: Pupils are equal and reactive, no scleral icterus,   Heart & CV:  RRR no murmur.  Intact distal pulses, good cap refill.  LUNGS: No increased work of breathing.Lugns are CTA B/L, no wheezing or crackles.  Abd: Obese abdomen, soft, nondistended, nontender, no masses  Ext: Normal bulk and tone, no lower extremity edema  Neuro: Alert and oriented, normal speech mentation    Hemoglobin 8.8, INR 1.44        CONSULTATION ASSESSMENT AND PLAN:    69 year old male with lower GI bleed likely due to to recent polypectomy and anticoagulation use.  Bowel movements seem to have slowed and hemoglobin has stabilized.  It appears as though the patient has clotted off the bleed.  I do not think repeat colonoscopy is warranted at this time.  I would hold off on restarting the patient's anticoagulation until we know for sure is able to stabilize and his bloody bowel movements have stopped.    Continue to hold warfarin  Ok to advance to low residue diet  Follow hemoglobin      Anuj Sky MD on 5/6/2019 at 12:47 PM

## 2019-05-06 NOTE — PLAN OF CARE
"Patient has had only one very small amount of maroon stool at lunch time, more of a smear.  Has had several urine occurrences which went unmeasured as patient was not using urinal.  Has denied any dizziness/lightheadedness today.   Tolerating advancing diet, denies nausea or abdominal discomfort.  Active bowel sounds, passing flatus, soft and non tender.  VSS with blood pressures being >100 systolic, afebrile.  Declines nicotine patient, lung sounds clear and on room air.  Patient states he has \"had a comfortable afternoon\".  Will continue to monitor.   "

## 2019-05-07 VITALS
HEIGHT: 72 IN | TEMPERATURE: 97.3 F | WEIGHT: 219 LBS | OXYGEN SATURATION: 95 % | BODY MASS INDEX: 29.66 KG/M2 | RESPIRATION RATE: 16 BRPM | SYSTOLIC BLOOD PRESSURE: 146 MMHG | DIASTOLIC BLOOD PRESSURE: 79 MMHG | HEART RATE: 72 BPM

## 2019-05-07 LAB
ANION GAP SERPL CALCULATED.3IONS-SCNC: 3 MMOL/L (ref 3–14)
BUN SERPL-MCNC: 11 MG/DL (ref 7–25)
CALCIUM SERPL-MCNC: 8.1 MG/DL (ref 8.6–10.3)
CHLORIDE SERPL-SCNC: 110 MMOL/L (ref 98–107)
CO2 SERPL-SCNC: 26 MMOL/L (ref 21–31)
CREAT SERPL-MCNC: 0.68 MG/DL (ref 0.7–1.3)
ERYTHROCYTE [DISTWIDTH] IN BLOOD BY AUTOMATED COUNT: 14.6 % (ref 10–15)
GFR SERPL CREATININE-BSD FRML MDRD: >90 ML/MIN/{1.73_M2}
GLUCOSE SERPL-MCNC: 125 MG/DL (ref 70–105)
HCT VFR BLD AUTO: 24.2 % (ref 40–53)
HGB BLD-MCNC: 8.1 G/DL (ref 13.3–17.7)
INR PPP: 1.32 (ref 0–1.3)
MCH RBC QN AUTO: 32.1 PG (ref 26.5–33)
MCHC RBC AUTO-ENTMCNC: 33.5 G/DL (ref 31.5–36.5)
MCV RBC AUTO: 96 FL (ref 78–100)
PLATELET # BLD AUTO: 138 10E9/L (ref 150–450)
POTASSIUM SERPL-SCNC: 3.9 MMOL/L (ref 3.5–5.1)
RBC # BLD AUTO: 2.52 10E12/L (ref 4.4–5.9)
SODIUM SERPL-SCNC: 139 MMOL/L (ref 134–144)
WBC # BLD AUTO: 6.6 10E9/L (ref 4–11)

## 2019-05-07 PROCEDURE — 85027 COMPLETE CBC AUTOMATED: CPT | Performed by: INTERNAL MEDICINE

## 2019-05-07 PROCEDURE — 99232 SBSQ HOSP IP/OBS MODERATE 35: CPT | Performed by: SURGERY

## 2019-05-07 PROCEDURE — 85610 PROTHROMBIN TIME: CPT | Performed by: INTERNAL MEDICINE

## 2019-05-07 PROCEDURE — 99239 HOSP IP/OBS DSCHRG MGMT >30: CPT | Performed by: INTERNAL MEDICINE

## 2019-05-07 PROCEDURE — 25000132 ZZH RX MED GY IP 250 OP 250 PS 637: Performed by: INTERNAL MEDICINE

## 2019-05-07 PROCEDURE — 80048 BASIC METABOLIC PNL TOTAL CA: CPT | Performed by: INTERNAL MEDICINE

## 2019-05-07 PROCEDURE — 36415 COLL VENOUS BLD VENIPUNCTURE: CPT | Performed by: INTERNAL MEDICINE

## 2019-05-07 RX ORDER — LISINOPRIL 10 MG/1
10 TABLET ORAL EVERY MORNING
DISCHARGE
Start: 2019-05-07

## 2019-05-07 RX ORDER — CARVEDILOL 3.12 MG/1
6.25 TABLET ORAL EVERY MORNING
Status: ON HOLD | COMMUNITY
End: 2019-05-07

## 2019-05-07 RX ORDER — CARVEDILOL 3.12 MG/1
6.25 TABLET ORAL EVERY MORNING
DISCHARGE
Start: 2019-05-07 | End: 2024-05-21

## 2019-05-07 RX ORDER — LISINOPRIL 10 MG/1
10 TABLET ORAL EVERY MORNING
Status: ON HOLD | COMMUNITY
End: 2019-05-07

## 2019-05-07 RX ADMIN — LISINOPRIL 10 MG: 10 TABLET ORAL at 09:12

## 2019-05-07 RX ADMIN — CARVEDILOL 3.12 MG: 3.12 TABLET, FILM COATED ORAL at 09:12

## 2019-05-07 ASSESSMENT — ACTIVITIES OF DAILY LIVING (ADL)
ADLS_ACUITY_SCORE: 21

## 2019-05-07 ASSESSMENT — MIFFLIN-ST. JEOR: SCORE: 1801.38

## 2019-05-07 NOTE — PROGRESS NOTES
[facility] DISCHARGE NOTE    Patient discharged to home at 0952 AM via wheel chair. Accompanied by spouse and staff. Discharge instructions reviewed with patient and spouse, opportunity offered to ask questions. Prescriptions - None ordered for discharge. All belongings sent with patient. Pt given education handout on low fiber diet.     Brigette Robertson

## 2019-05-07 NOTE — PROGRESS NOTES
GENERAL SURGERY PROGRESS NOTE  5/7/2019      Interval history:   No acute events overnight. Denies pain. No bowel movement since yesterday. He is ambulating independently, passing flatus and voiding per self. Denies lightheadedness or dizziness, he does feel a little tired.     Physical Exam:   Vital signs were reviewed and there were no significant abnormalities    General: laying in bed, appears comfortable  HEENT: no scleral icterus  Lungs: no increased work of breathing   Abdomen: obese abdomen, soft, non tender, no masses   MSK: normal bulk and tone, no lower extremity edema  Neuro: alert and oriented, normal speech and mentation     Labs were reviewed and are significant for hgb 8.1, INR 1.4    No new imaging       Assessment / Plan:   Juan C Troncoso is a 70yo male with anemia due to GI bleed, likely from dislodged clot from recent colonoscopy, in addition to anticoagulated state. INR has normalized and bleeding has stopped.     - continue low-residue diet  - agree with discharge to home   - follow up with PCP in one week - discuss resumption of anticoagulation at that time    - consider switching to apixiban for lower risk of bleeding episodes       LEÓN Sky MD   5/7/2019

## 2019-05-07 NOTE — PHARMACY - DISCHARGE MEDICATION RECONCILIATION AND EDUCATION
Pharmacy: Discharge Counseling and Medication Reconciliation    Juan C Troncoso  [address] [facility] [id number] [phone] (home)   69 year old male  PCP:Gin Solitario    No Known Allergies    Discharge Counseling:    Pharmacist met with patient (and/or family) today to review the medication portion of the After Visit Summary (with an emphasis on NEW medications) and to address patient's questions/concerns.     Summary of Education:   Met with patient at time of discharge to review all changes in medications including HOLDING aspirin and warfarin until follow with either PCP or . Patient had multiple questions regarding vitamin K containing foods affecting INR. All questions and all concerns were addressed.     Materials Provided:   MedCounselor sheets printed from Clinical Pharmacology on: none    Discharge Medication Reconciliation:    Ellie Scott has reviewed the patient's discharge medication orders and has compared them to the inpatient medication administration record and to what the patient was taking prior to admission- any discrepancies have been resolved.     Thank you for the consult.     Ellie Scott ....................  5/7/2019   9:37 AM

## 2019-05-07 NOTE — PLAN OF CARE
"Pt denies having pain. Lung sounds with LLL and RLL fine crackles posterior. Pt did not have a bowel movement on this shift. /84 (BP Location: Right arm)   Pulse 72   Temp 98.1  F (36.7  C) (Tympanic)   Resp 18   Ht 1.829 m (6')   Wt 99.3 kg (219 lb)   SpO2 97%. Pt states, \"I can't wait to go home today\". Love Vasquez RN on 5/7/2019 at 5:19 AM      "

## 2019-05-07 NOTE — DISCHARGE SUMMARY
"[facility]    Discharge Summary  Hospitalist    Date of Admission:  5/5/2019  Date of Discharge:  5/7/2019  Discharging Provider: Chaka Kong  Date of Service (when I saw the patient): 05/07/19    Discharge Diagnoses   Principal Problem:    Acute lower GI bleeding (5/5/2019)  Active Problems:    Atrial fibrillation (H) (10/4/2013)    Cardiomyopathy (H) (11/8/2013)    Anemia due to blood loss, acute (5/5/2019)    Tobacco abuse (5/6/2019)      History of Present Illness   Juan C Troncoso is an 69 year old male who presented with lower GI bleed likely secondary to post polypectomy bleeding.  Patient was admitted by Dr. Guerrero and per H&P, \"69 year old male on aspirin and warfarin who presents with bloody bowel movements today.  He had a colonoscopy with Dr. Layton on April 29, which was 6 days ago.  He started back on his warfarin and aspirin after the procedure.  Has been doing well until this morning when he had right blood in his stool.  He has now had 7 bloody bowel movements between home, the ER, and on admission to the hospital.  His last bowel movement was only blood, no stool.  He is starting to feel little lightheaded.  Hemoglobin in the ER was normal at 14.2. INR 1.66. Denies any chest pain or shortness of breath.  Generally health has been stable.  He was feeling well up until the bleeding started.\"    Hospital Course   Juan C Troncoso was admitted on 5/5/2019.  The following problems were addressed during his hospitalization:    Acute lower GI bleeding: Was initially made n.p.o., had multiple bright red bloody stools, Coumadin was obviously held and INR drifted down from 1.66->1.32 on day of discharge.  Bleeding spontaneously stopped although he was evaluated by general surgery, they did not feel he needed a repeat endoscopy.  He did receive 1 unit PRBCs on day of admission and hemoglobin remained stable and varied from 8.2->8.8->8.1 on day of discharge with no further bright red blood per " rectum.  Colonoscopy results were also reviewed as patient had his own copy and there were no noted AVMs or masses noted with colonoscopy.  Tolerated regular diet 24 hours prior to discharge, he will hold his aspirin and Coumadin until in clinic as scheduled and will need a repeat hemoglobin at that time to ensure improvement, he was counseled regarding regarding a high iron diet, he will also follow-up with Dr. Layton as an outpatient, and we discussed signs and symptoms regarding more prompt follow-up or returning to the ER.       Atrial fibrillation (H): Chronic rate controlled and anticoagulated.  Given his bleeding will hold Coumadin at this point, he will continue his Coreg and he should resume his aspirin and Coumadin in 7 days or at time of PCP follow-up assuming clinical stability.       Cardiomyopathy (H): Stable, most recent EF in the central records from 2013 normal, continue his ACE and beta-blocker.    Tobacco abuse: Ongoing 1 to 1-1/2 pack/day smoker, counseled to quit did not want nicotine patch or other tobacco cessation medications at discharge.    Chaka Kong MD    Significant Results and Procedures   none    Pending Results   These results will be followed up by NA  Unresulted Labs Ordered in the Past 30 Days of this Admission     No orders found from 3/6/2019 to 5/6/2019.          Code Status   Full Code       Primary Care Physician   Gin Solitario    Physical Exam   Temp: 97.3  F (36.3  C) Temp src: Tympanic BP: 146/79   Heart Rate: 64 Resp: 16 SpO2: 95 % O2 Device: None (Room air)    Vitals:    05/05/19 0650 05/06/19 0541 05/07/19 0400   Weight: 95.3 kg (210 lb) 101.3 kg (223 lb 6.4 oz) 99.3 kg (219 lb)     Vital Signs with Ranges  Temp:  [97.3  F (36.3  C)-99  F (37.2  C)] 97.3  F (36.3  C)  Heart Rate:  [64-94] 64  Resp:  [16-20] 16  BP: (109-146)/(61-84) 146/79  SpO2:  [94 %-97 %] 95 %  I/O last 3 completed shifts:  In: 900 [P.O.:900]  Out: 1750 [Urine:1750]    Exam on day of discharge:    GENERAL: Talkative, in no apparent distress.  CARDIOVASCULAR: Irregularly irregular rate and rhythm, no murmurs, rubs, or gallops. Normal S1/S2. No lower extremity edema.   RESPIRATORY: clear to auscultation bilaterally, no wheezes, no crackles.   GI: soft, non-tender deep palpation in all quadrants, non-distended, normoactive bowel sounds.  MUSCULOSKELETAL: warm and well perfused, 2+ dorsalis pedis pulses bilaterally.    SKIN: no pallor,jaundice, or rashes    Discharge Disposition   Discharged to home  Condition at discharge: Stable    Consultations This Hospital Stay   SURGERY GENERAL IP CONSULT    Time Spent on this Encounter   I, Chaka Kong, personally saw the patient today and spent greater than 30 minutes discharging this patient.    Discharge Orders      Reason for your hospital stay    Post polypectomy bleeding after recent colonoscopy     Follow-up and recommended labs and tests     Dr. Layton at [facility] on Thursday, May 23 at 12:00pm.  Hospital follow-up with Gin Marques at [facility] on Tuesday, May 14 at 10:45am.     Activity    Your activity upon discharge: activity as tolerated     When to contact your care team    Call your primary doctor if you have any of the following: temperature greater than 101,  increased shortness of breath, increased swelling, increased pain or further bleeding.     Discharge Instructions    - hold your coumadin and aspirin until you follow up in clinic and restart at that time unless you have further bleeding events  - there are no other changes to your medications at this time     Diet    Follow this diet upon discharge: Orders Placed This Encounter      Low Fiber Diet for the next week, with a focus on a diet high in iron     Discharge Medications   Current Discharge Medication List      CONTINUE these medications which have CHANGED    Details   carvedilol (COREG) 3.125 MG tablet Take 2 tablets (6.25 mg) by mouth every morning , and 3.125 mg by  mouth at bedtime    Associated Diagnoses: Essential hypertension, benign      lisinopril (PRINIVIL/ZESTRIL) 10 MG tablet Take 1 tablet (10 mg) by mouth every morning    Associated Diagnoses: Essential hypertension, benign         CONTINUE these medications which have NOT CHANGED    Details   aspirin (ASPIRIN) 81 MG EC tablet Take 81 mg by mouth daily       simvastatin (ZOCOR) 20 MG tablet Take 20 mg by mouth At Bedtime          STOP taking these medications       warfarin (COUMADIN) 5 MG tablet Comments:   Reason for Stopping:             Allergies   No Known Allergies  Data   Most Recent 3 CBC's:  Recent Labs   Lab Test 05/07/19  0452 05/06/19  0720 05/06/19  0430  05/05/19  0650   WBC 6.6 7.1  --   --  7.1   HGB 8.1* 8.8*  Canceled, Test credited 8.4*   < > 14.2   MCV 96 96  --   --  96   * 136*  --   --  184    < > = values in this interval not displayed.      Most Recent 3 BMP's:  Recent Labs   Lab Test 05/07/19  0452 05/06/19  0430 05/05/19  2100    138 137   POTASSIUM 3.9 4.0 4.2   CHLORIDE 110* 113* 113*   CO2 26 22 23   BUN 11 14 13   CR 0.68* 0.63* 0.66*   ANIONGAP 3 3 1*   NOMAN 8.1* 7.4* 7.2*   * 125* 119*     Most Recent 2 LFT's:  Recent Labs   Lab Test 05/05/19  0650   AST 14   ALT 14   ALKPHOS 47   BILITOTAL 0.7     Most Recent INR's and Anticoagulation Dosing History:  Anticoagulation Dose History     Recent Dosing and Labs Latest Ref Rng & Units 5/5/2019 5/6/2019 5/7/2019    INR 0 - 1.3 1.66(H) 1.44(H) 1.32(H)        Most Recent 3 Troponin's:No lab results found.  Most Recent Cholesterol Panel:No lab results found.  Most Recent 6 Bacteria Isolates From Any Culture (See EPIC Reports for Culture Details):No lab results found.  Most Recent TSH, T4 and A1c Labs:No lab results found.  Results for orders placed or performed in visit on 05/22/06   [facility] INTERFACED RAD RESULT    Narrative    FINAL RESULT  MARY RAMÍREZ  [id number]  05/22/06  [facility]  Imaging Services      Phone:  [phone] [address] [place]  [address]   Fax   #:  [phone]  PT Adrs:  [address]  [place] [address]  Exam performed by: ALLEN CELESTE    Patient Phone#: [phone]  Clinical:  ALCOHOLIC CARDIOMYOPATHY  ------------------------------------------------------------------------  ----------------------------------------------------  1  [id number] US ECHO 2D A [id number] US ECHO COLOR FLOW DOPP (B)  1  [id number] US ECHO DOPPLER (C)  ------------------------------------------------------------------------  ----------------------------------------------------  ECHOCARDIOGRAM REPORT  5/22/06  Quality:  X  Adequate,    Poor,  Comment    Heart rate:  54   Rhythm:  Sinus  Codes    WNL=Within Normal LimitsNO=Not Obtained          SB=See Report Below          VR=Valve Replacement          M-MODE  DOPPLER  3.6  Aorta     (3.7cm)  Aortic Valve:  WNL  WNL  4.9  LA         (4.0cm)        3.9  RV         (3.0 cm)  Mitral Valve:  WITHIN NORMAL LIMITS  Trace insufficiency  7.0  LVd       (5.6 cm)        4.3  LVs     (Variable)  Tricuspid Valve:  WNL  Trace insufficiency  1.2  IVsd      (1.2 cm)        1.8  IVss     (Variable)  Pulmonic Valve:  WNL  Trace insufficiency  1.2  LVPWd (1.2 cm)        1.7  LVPWs Variable)  LV Function:  WNL      LV Eject Fraction:  55-60%      C Output:  8.5 liters per minute          Pericardium:  WNL  DIASTOLIC FUNCTION          226  Mdt(ms)  27  IVRT(ms)  80  E.Wolfgang(cm/sec)  47  A.Wolfgang(cm/sec)                 INTERPRETATION:   1:  The left ventricular chamber size is enlarged with normal left  ventricular systolic function.  There are no focal wall motion  abnormalities.  2:  The left atrium is enlarged measuring 58 ml per meter squared.  There is trace mitral valve insufficiency.  The left ventricular  filling pattern is normal.  3:  The right ventricle and atrial sizes are enlarged.  The right  ventricular function is normal.  There is trace  tricuspid valve  insufficiency.  The right ventricular systolic pressure is normal being  27 mm/HG plus the right atrial pressure.  4:  There is no evidence for intracardiac thrombus, mass nor  intraatrial shunt.  5:  Comparison is made to the prior exam 2/24/05.  At that time the  patient was in atrial fibrillation.  The ejection fraction is now  improved.                Read By:  ALEX VILLAR / NURYS Approved  By:  CASSIE PASTRANA M.D.  D: 05/22/06 T:05/22/06 17:37  --------------------------------------------------------------------  MR#: [id number] AGE: 56 SEX: M VT:O   RM#:  Family Physician: GWEN TEJADA RVID:[id number] DOS: 05/22/06  Ordering Physician: MD BLACKBURN EDWIN  IMAGE#:[id number]    1

## 2019-05-08 LAB
BLD PROD TYP BPU: NORMAL
BLD UNIT ID BPU: 0
BLOOD PRODUCT CODE: NORMAL
BPU ID: NORMAL
TRANSFUSION STATUS PATIENT QL: NORMAL
TRANSFUSION STATUS PATIENT QL: NORMAL

## 2023-05-22 ENCOUNTER — TRANSFERRED RECORDS (OUTPATIENT)
Dept: MULTI SPECIALTY CLINIC | Facility: CLINIC | Age: 73
End: 2023-05-22

## 2024-02-15 ENCOUNTER — ANTICOAGULATION THERAPY VISIT (OUTPATIENT)
Dept: ANTICOAGULATION | Facility: OTHER | Age: 74
End: 2024-02-15
Payer: COMMERCIAL

## 2024-02-15 ENCOUNTER — OFFICE VISIT (OUTPATIENT)
Dept: FAMILY MEDICINE | Facility: OTHER | Age: 74
End: 2024-02-15
Attending: FAMILY MEDICINE
Payer: COMMERCIAL

## 2024-02-15 VITALS
HEART RATE: 77 BPM | DIASTOLIC BLOOD PRESSURE: 62 MMHG | OXYGEN SATURATION: 97 % | RESPIRATION RATE: 20 BRPM | WEIGHT: 266 LBS | SYSTOLIC BLOOD PRESSURE: 102 MMHG | TEMPERATURE: 97.2 F | HEIGHT: 72 IN | BODY MASS INDEX: 36.03 KG/M2

## 2024-02-15 DIAGNOSIS — Z00.00 ENCOUNTER FOR MEDICARE ANNUAL WELLNESS EXAM: ICD-10-CM

## 2024-02-15 DIAGNOSIS — I48.11 LONGSTANDING PERSISTENT ATRIAL FIBRILLATION (H): Primary | ICD-10-CM

## 2024-02-15 DIAGNOSIS — E66.812 CLASS 2 SEVERE OBESITY DUE TO EXCESS CALORIES WITH SERIOUS COMORBIDITY AND BODY MASS INDEX (BMI) OF 36.0 TO 36.9 IN ADULT (H): ICD-10-CM

## 2024-02-15 DIAGNOSIS — N40.1 BENIGN PROSTATIC HYPERPLASIA WITH URINARY FREQUENCY: ICD-10-CM

## 2024-02-15 DIAGNOSIS — E66.01 CLASS 2 SEVERE OBESITY DUE TO EXCESS CALORIES WITH SERIOUS COMORBIDITY AND BODY MASS INDEX (BMI) OF 36.0 TO 36.9 IN ADULT (H): ICD-10-CM

## 2024-02-15 DIAGNOSIS — D68.69 HYPERCOAGULABLE STATE DUE TO LONGSTANDING PERSISTENT ATRIAL FIBRILLATION (H): ICD-10-CM

## 2024-02-15 DIAGNOSIS — I48.11 HYPERCOAGULABLE STATE DUE TO LONGSTANDING PERSISTENT ATRIAL FIBRILLATION (H): ICD-10-CM

## 2024-02-15 DIAGNOSIS — Z12.5 ENCOUNTER FOR SCREENING FOR MALIGNANT NEOPLASM OF PROSTATE: ICD-10-CM

## 2024-02-15 DIAGNOSIS — Z13.6 SCREENING FOR AAA (ABDOMINAL AORTIC ANEURYSM): ICD-10-CM

## 2024-02-15 DIAGNOSIS — R35.0 BENIGN PROSTATIC HYPERPLASIA WITH URINARY FREQUENCY: ICD-10-CM

## 2024-02-15 PROBLEM — D62 ANEMIA DUE TO BLOOD LOSS, ACUTE: Status: RESOLVED | Noted: 2019-05-05 | Resolved: 2024-02-15

## 2024-02-15 PROBLEM — Z87.891 HISTORY OF TOBACCO ABUSE: Status: ACTIVE | Noted: 2019-05-06

## 2024-02-15 PROBLEM — K92.2 ACUTE LOWER GI BLEEDING: Status: RESOLVED | Noted: 2019-05-05 | Resolved: 2024-02-15

## 2024-02-15 PROBLEM — Z72.0 TOBACCO ABUSE: Status: RESOLVED | Noted: 2019-05-06 | Resolved: 2024-02-15

## 2024-02-15 LAB
ALBUMIN SERPL BCG-MCNC: 4.3 G/DL (ref 3.5–5.2)
ALP SERPL-CCNC: 53 U/L (ref 40–150)
ALT SERPL W P-5'-P-CCNC: 13 U/L (ref 0–70)
ANION GAP SERPL CALCULATED.3IONS-SCNC: 8 MMOL/L (ref 7–15)
AST SERPL W P-5'-P-CCNC: 15 U/L (ref 0–45)
BASOPHILS # BLD AUTO: 0.1 10E3/UL (ref 0–0.2)
BASOPHILS NFR BLD AUTO: 1 %
BILIRUB SERPL-MCNC: 0.7 MG/DL
BUN SERPL-MCNC: 19.2 MG/DL (ref 8–23)
CALCIUM SERPL-MCNC: 9.6 MG/DL (ref 8.8–10.2)
CHLORIDE SERPL-SCNC: 103 MMOL/L (ref 98–107)
CHOLEST SERPL-MCNC: 156 MG/DL
CREAT SERPL-MCNC: 0.98 MG/DL (ref 0.67–1.17)
DEPRECATED HCO3 PLAS-SCNC: 26 MMOL/L (ref 22–29)
EGFRCR SERPLBLD CKD-EPI 2021: 82 ML/MIN/1.73M2
EOSINOPHIL # BLD AUTO: 0.2 10E3/UL (ref 0–0.7)
EOSINOPHIL NFR BLD AUTO: 4 %
ERYTHROCYTE [DISTWIDTH] IN BLOOD BY AUTOMATED COUNT: 13.2 % (ref 10–15)
FASTING STATUS PATIENT QL REPORTED: YES
GLUCOSE SERPL-MCNC: 118 MG/DL (ref 70–99)
HCT VFR BLD AUTO: 45.5 % (ref 40–53)
HDLC SERPL-MCNC: 69 MG/DL
HGB BLD-MCNC: 15 G/DL (ref 13.3–17.7)
IMM GRANULOCYTES # BLD: 0 10E3/UL
IMM GRANULOCYTES NFR BLD: 0 %
INR PPP: 2.39 (ref 0.85–1.15)
LDLC SERPL CALC-MCNC: 67 MG/DL
LYMPHOCYTES # BLD AUTO: 1.7 10E3/UL (ref 0.8–5.3)
LYMPHOCYTES NFR BLD AUTO: 25 %
MCH RBC QN AUTO: 30.3 PG (ref 26.5–33)
MCHC RBC AUTO-ENTMCNC: 33 G/DL (ref 31.5–36.5)
MCV RBC AUTO: 92 FL (ref 78–100)
MONOCYTES # BLD AUTO: 0.7 10E3/UL (ref 0–1.3)
MONOCYTES NFR BLD AUTO: 10 %
NEUTROPHILS # BLD AUTO: 4.1 10E3/UL (ref 1.6–8.3)
NEUTROPHILS NFR BLD AUTO: 60 %
NONHDLC SERPL-MCNC: 87 MG/DL
NRBC # BLD AUTO: 0 10E3/UL
NRBC BLD AUTO-RTO: 0 /100
PLATELET # BLD AUTO: 181 10E3/UL (ref 150–450)
POTASSIUM SERPL-SCNC: 4.4 MMOL/L (ref 3.4–5.3)
PROT SERPL-MCNC: 7.1 G/DL (ref 6.4–8.3)
PSA SERPL DL<=0.01 NG/ML-MCNC: 1.41 NG/ML (ref 0–6.5)
RBC # BLD AUTO: 4.95 10E6/UL (ref 4.4–5.9)
SODIUM SERPL-SCNC: 137 MMOL/L (ref 135–145)
TRIGL SERPL-MCNC: 99 MG/DL
WBC # BLD AUTO: 6.7 10E3/UL (ref 4–11)

## 2024-02-15 PROCEDURE — 80061 LIPID PANEL: CPT | Mod: ZL | Performed by: FAMILY MEDICINE

## 2024-02-15 PROCEDURE — G0463 HOSPITAL OUTPT CLINIC VISIT: HCPCS | Mod: 25

## 2024-02-15 PROCEDURE — 85610 PROTHROMBIN TIME: CPT | Mod: ZL | Performed by: FAMILY MEDICINE

## 2024-02-15 PROCEDURE — 80053 COMPREHEN METABOLIC PANEL: CPT | Mod: ZL | Performed by: FAMILY MEDICINE

## 2024-02-15 PROCEDURE — G0103 PSA SCREENING: HCPCS | Mod: ZL | Performed by: FAMILY MEDICINE

## 2024-02-15 PROCEDURE — 85025 COMPLETE CBC W/AUTO DIFF WBC: CPT | Mod: ZL | Performed by: FAMILY MEDICINE

## 2024-02-15 PROCEDURE — 36415 COLL VENOUS BLD VENIPUNCTURE: CPT | Mod: ZL | Performed by: FAMILY MEDICINE

## 2024-02-15 PROCEDURE — G0439 PPPS, SUBSEQ VISIT: HCPCS | Performed by: FAMILY MEDICINE

## 2024-02-15 RX ORDER — AMLODIPINE BESYLATE 5 MG/1
1 TABLET ORAL DAILY
COMMUNITY
Start: 2023-09-22 | End: 2024-09-26

## 2024-02-15 RX ORDER — WARFARIN SODIUM 5 MG/1
5 TABLET ORAL DAILY
COMMUNITY
End: 2024-09-26

## 2024-02-15 RX ORDER — ROSUVASTATIN CALCIUM 10 MG/1
1 TABLET, COATED ORAL DAILY
COMMUNITY
Start: 2024-01-03 | End: 2024-09-26

## 2024-02-15 SDOH — HEALTH STABILITY: PHYSICAL HEALTH: ON AVERAGE, HOW MANY MINUTES DO YOU ENGAGE IN EXERCISE AT THIS LEVEL?: 10 MIN

## 2024-02-15 SDOH — HEALTH STABILITY: PHYSICAL HEALTH: ON AVERAGE, HOW MANY DAYS PER WEEK DO YOU ENGAGE IN MODERATE TO STRENUOUS EXERCISE (LIKE A BRISK WALK)?: 2 DAYS

## 2024-02-15 ASSESSMENT — SOCIAL DETERMINANTS OF HEALTH (SDOH): HOW OFTEN DO YOU GET TOGETHER WITH FRIENDS OR RELATIVES?: TWICE A WEEK

## 2024-02-15 ASSESSMENT — PAIN SCALES - GENERAL: PAINLEVEL: NO PAIN (0)

## 2024-02-15 NOTE — NURSING NOTE
Patient here for annual physical, last eye exam 2022 and last dental exam long time ago. Medication Reconciliation: complete.    Мария Edwards LPN  2/15/2024 8:15 AM

## 2024-02-15 NOTE — LETTER
February 16, 2024      Juan C JAKOB Troncoso  [address] [facility] [address]        Dear ,    We are writing to inform you of your test results.    Your test results fall within the expected range(s) or remain unchanged from previous results.  Please continue with current treatment plan.    Resulted Orders   Lipid Panel   Result Value Ref Range    Cholesterol 156 <200 mg/dL    Triglycerides 99 <150 mg/dL    Direct Measure HDL 69 >=40 mg/dL    LDL Cholesterol Calculated 67 <=100 mg/dL    Non HDL Cholesterol 87 <130 mg/dL    Patient Fasting > 8hrs? Yes     Narrative    Cholesterol  Desirable:  <200 mg/dL    Triglycerides  Normal:  Less than 150 mg/dL  Borderline High:  150-199 mg/dL  High:  200-499 mg/dL  Very High:  Greater than or equal to 500 mg/dL    Direct Measure HDL  Female:  Greater than or equal to 50 mg/dL   Male:  Greater than or equal to 40 mg/dL    LDL Cholesterol  Desirable:  <100mg/dL  Above Desirable:  100-129 mg/dL   Borderline High:  130-159 mg/dL   High:  160-189 mg/dL   Very High:  >= 190 mg/dL    Non HDL Cholesterol  Desirable:  130 mg/dL  Above Desirable:  130-159 mg/dL  Borderline High:  160-189 mg/dL  High:  190-219 mg/dL  Very High:  Greater than or equal to 220 mg/dL   Comprehensive Metabolic Panel   Result Value Ref Range    Sodium 137 135 - 145 mmol/L      Comment:      Reference intervals for this test were updated on 09/26/2023 to more accurately reflect our healthy population. There may be differences in the flagging of prior results with similar values performed with this method. Interpretation of those prior results can be made in the context of the updated reference intervals.     Potassium 4.4 3.4 - 5.3 mmol/L    Carbon Dioxide (CO2) 26 22 - 29 mmol/L    Anion Gap 8 7 - 15 mmol/L    Urea Nitrogen 19.2 8.0 - 23.0 mg/dL    Creatinine 0.98 0.67 - 1.17 mg/dL    GFR Estimate 82 >60 mL/min/1.73m2    Calcium 9.6 8.8 - 10.2 mg/dL    Chloride 103 98 - 107 mmol/L    Glucose 118  (H) 70 - 99 mg/dL    Alkaline Phosphatase 53 40 - 150 U/L      Comment:      Reference intervals for this test were updated on 11/14/2023 to more accurately reflect our healthy population. There may be differences in the flagging of prior results with similar values performed with this method. Interpretation of those prior results can be made in the context of the updated reference intervals.    AST 15 0 - 45 U/L      Comment:      Reference intervals for this test were updated on 6/12/2023 to more accurately reflect our healthy population. There may be differences in the flagging of prior results with similar values performed with this method. Interpretation of those prior results can be made in the context of the updated reference intervals.    ALT 13 0 - 70 U/L      Comment:      Reference intervals for this test were updated on 6/12/2023 to more accurately reflect our healthy population. There may be differences in the flagging of prior results with similar values performed with this method. Interpretation of those prior results can be made in the context of the updated reference intervals.      Protein Total 7.1 6.4 - 8.3 g/dL    Albumin 4.3 3.5 - 5.2 g/dL    Bilirubin Total 0.7 <=1.2 mg/dL   PSA Screen GH   Result Value Ref Range    Prostate Specific Antigen Screen 1.41 0.00 - 6.50 ng/mL    Narrative    This result is obtained using the Roche Elecsys total PSA method on the alice e601 immunoassay analyzer. Results obtained with different assay methods or kits cannot be used interchangeably.   CBC with platelets and differential   Result Value Ref Range    WBC Count 6.7 4.0 - 11.0 10e3/uL    RBC Count 4.95 4.40 - 5.90 10e6/uL    Hemoglobin 15.0 13.3 - 17.7 g/dL    Hematocrit 45.5 40.0 - 53.0 %    MCV 92 78 - 100 fL    MCH 30.3 26.5 - 33.0 pg    MCHC 33.0 31.5 - 36.5 g/dL    RDW 13.2 10.0 - 15.0 %    Platelet Count 181 150 - 450 10e3/uL    % Neutrophils 60 %    % Lymphocytes 25 %    % Monocytes 10 %    %  Eosinophils 4 %    % Basophils 1 %    % Immature Granulocytes 0 %    NRBCs per 100 WBC 0 <1 /100    Absolute Neutrophils 4.1 1.6 - 8.3 10e3/uL    Absolute Lymphocytes 1.7 0.8 - 5.3 10e3/uL    Absolute Monocytes 0.7 0.0 - 1.3 10e3/uL    Absolute Eosinophils 0.2 0.0 - 0.7 10e3/uL    Absolute Basophils 0.1 0.0 - 0.2 10e3/uL    Absolute Immature Granulocytes 0.0 <=0.4 10e3/uL    Absolute NRBCs 0.0 10e3/uL       If you have any questions or concerns, please call the clinic at the number listed above.       Sincerely,      Kayode Mcgowan MD

## 2024-02-15 NOTE — PROGRESS NOTES
ANTICOAGULATION  MANAGEMENT: NEW REFERRAL      SUBJECTIVE/OBJECTIVE     Juan C Troncoso, a 73 year old male  is newly referred to [facility].    Anticoagulation:    Previously on warfarin: Yes,  several years documentation in care everywhere back to .  Approximate previous dose: 5 mg Wed and 7.5 mg all other days  Warfarin initiation date (approximate):    Indication(s): Atrial Fibrillation   Goal Range: 2.0-3.0   Anticoagulation Bridge/Overlap: No   Referring provider: from PCP    General Dietary/Social Hx:    Typical vitamin K intake: moderate; consistent     Other dietary considerations: None     Social History:   Social History     Tobacco Use    Smoking status: Former     Packs/day: 1.50     Years: 50.00     Additional pack years: 0.00     Total pack years: 75.00     Types: Cigarettes     Quit date: 2020     Years since quittin.0    Smokeless tobacco: Former   Vaping Use    Vaping Use: Never used   Substance Use Topics    Alcohol use: Yes     Comment: 2    Drug use: Not Currently       In the past 2 weeks, patient estimates taking medications as instructed % of time: 100%    Results:        Recent labs: (last 7 days)     02/15/24  0845   INR 2.39*       Wt Readings from Last 2 Encounters:   02/15/24 266 lb (120.7 kg)   19 219 lb (99.3 kg)      Estimated body mass index is 36.08 kg/m  as calculated from the following:    Height as of an earlier encounter on 2/15/24: 6' (1.829 m).    Weight as of an earlier encounter on 2/15/24: 266 lb (120.7 kg).  Lab Results   Component Value Date    AST 15 02/15/2024    ALT 13 02/15/2024    ALBUMIN 4.3 02/15/2024     Lab Results   Component Value Date    CR 0.98 02/15/2024     Estimated Creatinine Clearance: 91.4 mL/min (based on SCr of 0.98 mg/dL).    ASSESSMENT     Goal INR 2-3, standard for indication(s) above  On warfarin > 30 days; maintenance dose has been established  Maintenance dose established prior to referral    PLAN      Dosing Instructions: Continue your current warfarin dose with INR in 6 weeks       Summary  As of 2/15/2024      Full warfarin instructions:  5 mg every Wed; 7.5 mg all other days   Next INR check:  3/28/2024               Education provided:   Please call back if any changes to your diet, medications or how you've been taking warfarin  Contact [phone] with any changes, questions or concerns.     Education still needed:   None required      Telephone call with Juan C who verbalizes understanding and agrees to plan    Lab visit scheduled    Standing orders placed in Epic: Point of Care INR (Lab 5000)    Plan made per [facility] anticoagulation protocol    Yumiko Polk, RN  Anticoagulation Clinic  2/15/2024

## 2024-02-15 NOTE — PROGRESS NOTES
Preventive Care Visit  [facility]  Kayode Mcgowan MD, Family Medicine  Feb 15, 2024    Assessment & Plan     Longstanding persistent atrial fibrillation (H)    - INR; Future  - Anticoagulation Clinic Referral  - Comprehensive Metabolic Panel; Future  - INR  - Comprehensive Metabolic Panel    Class 2 severe obesity due to excess calories with serious comorbidity and body mass index (BMI) of 36.0 to 36.9 in adult (H)      Encounter for Medicare annual wellness exam    - Lipid Panel; Future  - CBC and Differential; Future  - Lipid Panel  - CBC and Differential    Benign prostatic hyperplasia with urinary frequency    - PSA Screen GH; Future  - PSA Screen GH    Encounter for screening for malignant neoplasm of prostate    - PSA Screen GH; Future  - PSA Screen GH    Hypercoagulable state due to longstanding persistent atrial fibrillation (H)      Screening for AAA (abdominal aortic aneurysm)  Completed 2022 outside clinic and negative.  -            BMI  Estimated body mass index is 36.08 kg/m  as calculated from the following:    Height as of this encounter: 1.829 m (6').    Weight as of this encounter: 120.7 kg (266 lb).       Counseling  Appropriate preventive services were discussed with this patient, including applicable screening as appropriate for fall prevention, nutrition, physical activity, Tobacco-use cessation, weight loss and cognition.  Checklist reviewing preventive services available has been given to the patient.  Reviewed patient's diet, addressing concerns and/or questions.   He is at risk for lack of exercise and has been provided with information to increase physical activity for the benefit of his well-being.   The patient was instructed to see the dentist every 6 months.   The patient reports drinking more than one alcoholic drink per day and sometimes engages in binge or excessive drinking. The patient was counseled and given information about possible harmful effects of  excessive alcohol intake as well as where to get help for alcohol problems.           No follow-ups on file.    Aspen Irizarry is a 73 year old, presenting for the following:  Wellness Visit          Via the Health Maintenance questionnaire, the patient has reported the following services have been completed -Colonscopy, this information has been sent to the abstraction team.  Health Care Directive  Patient does not have a Health Care Directive or Living Will: Discussed advance care planning with patient; however, patient declined at this time.    HPI  Patient arrives here for Medicare wellness.  Except for occasional leg weakness he has no complaints or concerns.  He believes his leg weakness is likely due to inactivity.            2/15/2024   General Health   How would you rate your overall physical health? (!) POOR   Feel stress (tense, anxious, or unable to sleep) To some extent   (!) STRESS CONCERN      2/15/2024   Nutrition   Diet: I don't know         2/15/2024   Exercise   Days per week of moderate/strenous exercise 2 days   Average minutes spent exercising at this level 10 min   (!) EXERCISE CONCERN      2/15/2024   Social Factors   Frequency of gathering with friends or relatives Twice a week   Worry food won't last until get money to buy more No   Food not last or not have enough money for food? No   Do you have housing?  Yes   Are you worried about losing your housing? No   Lack of transportation? No   Unable to get utilities (heat,electricity)? No         2/15/2024   Fall Risk   Fallen 2 or more times in the past year? No    No    No   Trouble with walking or balance? (!) YES    Yes    Yes          2/15/2024   Activities of Daily Living- Home Safety   Needs help with the following daily activites None of the above   Safety concerns in the home None of the above         2/15/2024   Dental   Dentist two times every year? (!) NO         2/15/2024   Hearing Screening   Hearing concerns? None of the above          2/15/2024   Driving Risk Screening   Patient/family members have concerns about driving No         2/15/2024   General Alertness/Fatigue Screening   Have you been more tired than usual lately? No         2/15/2024   Urinary Incontinence Screening   Bothered by leaking urine in past 6 months No          No data to display                  Today's PHQ-2 Score:       2/15/2024     8:08 AM   PHQ-2 ( 1999 [employer])   Q1: Little interest or pleasure in doing things 0   Q2: Feeling down, depressed or hopeless 0   PHQ-2 Score 0   Q1: Little interest or pleasure in doing things Not at all   Q2: Feeling down, depressed or hopeless Not at all   PHQ-2 Score 0           2/15/2024   Substance Use   Alcohol more than 3/day or more than 7/wk Yes   How often do you have a drink containing alcohol 2 to 3 times a week   How many alcohol drinks on typical day 3 or 4   How often do you have 5+ drinks at one occasion Less than monthly   Audit 2/3 Score 2   How often not able to stop drinking once started Never   How often failed to do what normally expected Never   How often needed first drink in am after a heavy drinking session Never   How often feeling of guilt or remorse after drinking Never   How often unable to remember what happened the night before Never   Have you or someone else been injured because of your drinking No   Has anyone been concerned or suggested you cut down on drinking No   TOTAL SCORE - AUDIT 5   Do you have a current opioid prescription? No   How severe/bad is pain from 1 to 10? 0/10 (No Pain)   Do you use any other substances recreationally? (!) ALCOHOL     Social History     Tobacco Use    Smoking status: Former     Types: Cigarettes    Smokeless tobacco: Former   Vaping Use    Vaping Use: Never used   Substance Use Topics    Alcohol use: Yes     Comment: 2    Drug use: Not Currently           2/15/2024   AAA Screening   Family history of Abdominal Aortic Aneurysm (AAA)? (!) YES screened 2022   The  ASCVD Risk score (Glenda NINO, et al., 2019) failed to calculate for the following reasons:    Cannot find a previous HDL lab    Cannot find a previous total cholesterol lab            Reviewed and updated as needed this visit by Provider                      Current providers sharing in care for this patient include:  Patient Care Team:  No Ref-Primary, Physician as PCP - General    The following health maintenance items are reviewed in Epic and correct as of today:  Health Maintenance   Topic Date Due    LIPID  Never done    ADVANCE CARE PLANNING  Never done    HEPATITIS C SCREENING  Never done    RSV VACCINE (Pregnancy & 60+) (1 - 1-dose 60+ series) Never done    COLORECTAL CANCER SCREENING  03/19/2022    GLUCOSE  05/07/2022    MEDICARE ANNUAL WELLNESS VISIT  07/17/2024    FALL RISK ASSESSMENT  02/15/2025    DTAP/TDAP/TD IMMUNIZATION (2 - Td or Tdap) 04/16/2029    PHQ-2 (once per calendar year)  Completed    INFLUENZA VACCINE  Completed    Pneumococcal Vaccine: 65+ Years  Completed    ZOSTER IMMUNIZATION  Completed    COVID-19 Vaccine  Completed    IPV IMMUNIZATION  Aged Out    HPV IMMUNIZATION  Aged Out    MENINGITIS IMMUNIZATION  Aged Out    RSV MONOCLONAL ANTIBODY  Aged Out            Objective    Exam  /62   Pulse 77   Temp 97.2  F (36.2  C)   Resp 20   Ht 1.829 m (6')   Wt 120.7 kg (266 lb)   SpO2 97%   BMI 36.08 kg/m     Estimated body mass index is 36.08 kg/m  as calculated from the following:    Height as of this encounter: 1.829 m (6').    Weight as of this encounter: 120.7 kg (266 lb).    Physical Exam  GENERAL: alert and no distress  NECK: no adenopathy, no asymmetry, masses, or scars  RESP: lungs clear to auscultation - no rales, rhonchi or wheezes  CV: regular rate and rhythm, normal S1 S2, no S3 or S4, no murmur, click or rub, no peripheral edema  ABDOMEN: soft, nontender, no hepatosplenomegaly, no masses and bowel sounds normal  MS: no gross musculoskeletal defects noted, no edema         2/15/2024   Mini Cog   Clock Draw Score 0 Abnormal   3 Item Recall 3 objects recalled   Mini Cog Total Score 3            Signed Electronically by: Kayode Mcgowan MD

## 2024-02-16 PROBLEM — D68.69 HYPERCOAGULABLE STATE DUE TO LONGSTANDING PERSISTENT ATRIAL FIBRILLATION (H): Status: ACTIVE | Noted: 2024-02-16

## 2024-02-16 PROBLEM — I48.11 HYPERCOAGULABLE STATE DUE TO LONGSTANDING PERSISTENT ATRIAL FIBRILLATION (H): Status: ACTIVE | Noted: 2024-02-16

## 2024-03-28 ENCOUNTER — ANTICOAGULATION THERAPY VISIT (OUTPATIENT)
Dept: ANTICOAGULATION | Facility: OTHER | Age: 74
End: 2024-03-28
Attending: FAMILY MEDICINE
Payer: COMMERCIAL

## 2024-03-28 DIAGNOSIS — I48.11 LONGSTANDING PERSISTENT ATRIAL FIBRILLATION (H): Primary | ICD-10-CM

## 2024-03-28 LAB — INR POINT OF CARE: 2.4 (ref 0.9–1.1)

## 2024-03-28 PROCEDURE — 36416 COLLJ CAPILLARY BLOOD SPEC: CPT | Mod: ZL

## 2024-03-28 NOTE — PROGRESS NOTES
ANTICOAGULATION MANAGEMENT     Juan C Troncoso 74 year old male is on warfarin with therapeutic INR result. (Goal INR 2.0-3.0)    Recent labs: (last 7 days)     03/28/24  0854   INR 2.4*       ASSESSMENT     Source(s): In person     Warfarin doses taken: Warfarin taken as instructed  Diet: No new diet changes identified  New illness, injury, or hospitalization: No  Medication/supplement changes: None noted  Signs or symptoms of bleeding or clotting: No  Previous INR: Therapeutic last 2(+) visits  Additional findings: None     PLAN     Recommended plan for no diet, medication or health factor changes affecting INR     Dosing Instructions: Continue your current warfarin dose with next INR in 6 weeks       Summary  As of 3/28/2024      Full warfarin instructions:  5 mg every Wed; 7.5 mg all other days   Next INR check:  5/9/2024               In person    Lab visit scheduled    Education provided: Please call back if any changes to your diet, medications or how you've been taking warfarin    Plan made per [facility] anticoagulation protocol    Sarai Montano RN  Anticoagulation Clinic  3/28/2024    _______________________________________________________________________     Anticoagulation Episode Summary       Current INR goal:  2.0-3.0   TTR:  100.0% (1.1 mo)   Target end date:  Indefinite   Send INR reminders to:  [facility]    Indications    Longstanding persistent atrial fibrillation (H) [I48.11]             Comments:               Anticoagulation Care Providers       Provider Role Specialty Phone number    Kayode Mcgowan MD Referring Family Medicine [phone]

## 2024-05-09 ENCOUNTER — ANTICOAGULATION THERAPY VISIT (OUTPATIENT)
Dept: ANTICOAGULATION | Facility: OTHER | Age: 74
End: 2024-05-09
Attending: FAMILY MEDICINE
Payer: COMMERCIAL

## 2024-05-09 DIAGNOSIS — I48.11 LONGSTANDING PERSISTENT ATRIAL FIBRILLATION (H): Primary | ICD-10-CM

## 2024-05-09 LAB — INR POINT OF CARE: 2.3 (ref 0.9–1.1)

## 2024-05-09 PROCEDURE — 36416 COLLJ CAPILLARY BLOOD SPEC: CPT | Mod: ZL

## 2024-05-09 NOTE — PROGRESS NOTES
ANTICOAGULATION MANAGEMENT     Juan C Troncoso 74 year old male is on warfarin with therapeutic INR result. (Goal INR 2.0-3.0)    Recent labs: (last 7 days)     05/09/24  0904   INR 2.3*       ASSESSMENT     Source(s): In person     Warfarin doses taken: Warfarin taken as instructed  Diet: No new diet changes identified  New illness, injury, or hospitalization: No  Medication/supplement changes: None noted  Signs or symptoms of bleeding or clotting: No  Previous INR: Therapeutic last 2(+) visits  Additional findings: None     PLAN     Recommended plan for no diet, medication or health factor changes affecting INR     Dosing Instructions: Continue your current warfarin dose with next INR in 6 weeks       Summary  As of 5/9/2024      Full warfarin instructions:  5 mg every Wed; 7.5 mg all other days   Next INR check:  6/20/2024               In person    Lab visit scheduled    Education provided: Please call back if any changes to your diet, medications or how you've been taking warfarin    Plan made per [facility] anticoagulation protocol    Sarai Montano RN  Anticoagulation Clinic  5/9/2024    _______________________________________________________________________     Anticoagulation Episode Summary       Current INR goal:  2.0-3.0   TTR:  100.0% (2.5 mo)   Target end date:  Indefinite   Send INR reminders to:  [facility]    Indications    Longstanding persistent atrial fibrillation (H) [I48.11]             Comments:               Anticoagulation Care Providers       Provider Role Specialty Phone number    Kayode Mcgowan MD Referring Family Medicine [phone]

## 2024-05-21 ENCOUNTER — TELEPHONE (OUTPATIENT)
Dept: FAMILY MEDICINE | Facility: OTHER | Age: 74
End: 2024-05-21
Payer: COMMERCIAL

## 2024-05-21 DIAGNOSIS — I10 ESSENTIAL HYPERTENSION, BENIGN: Primary | ICD-10-CM

## 2024-05-21 RX ORDER — CARVEDILOL 3.12 MG/1
6.25 TABLET ORAL EVERY MORNING
Qty: 270 TABLET | Refills: 2 | Status: SHIPPED | OUTPATIENT
Start: 2024-05-21 | End: 2024-05-21

## 2024-05-21 RX ORDER — CARVEDILOL 3.12 MG/1
TABLET ORAL
Qty: 21 TABLET | Refills: 0 | Status: SHIPPED | OUTPATIENT
Start: 2024-05-21 | End: 2024-05-21

## 2024-05-21 RX ORDER — CARVEDILOL 3.12 MG/1
6.25 TABLET ORAL EVERY MORNING
Qty: 270 TABLET | Refills: 2 | Status: SHIPPED | OUTPATIENT
Start: 2024-05-21 | End: 2024-09-26

## 2024-05-21 RX ORDER — CARVEDILOL 3.12 MG/1
TABLET ORAL
Qty: 21 TABLET | Refills: 0 | Status: SHIPPED | OUTPATIENT
Start: 2024-05-21

## 2024-05-21 NOTE — TELEPHONE ENCOUNTER
Patient called and he verified his last name and . He was informed of prescription. His wife states the mail order pharmacy said he won't get his prescription until  and Pt will run out before . She requested small supply be sent to [facility]. Pam Zafar RN .............. 2024  4:12 PM

## 2024-05-21 NOTE — TELEPHONE ENCOUNTER
Pt states that he needs a refill for this medication. Says that it has been filled previously by his doctor at [facility], but that since he has started seeing Dr. Mcgowan, he believes the prescription should be going through Dr. Mcgowan now. Pt was informed that the last refill of this Rx on our records was 5/07/2019, and that providers often need to have discussed the medication recently in order to refill it, and his pharmacy may have sent the request to his doctor at [facility]. Pt stated that he has been taking the medication for 15 years so it should not matter. Pt requests a phone call with updates regarding this refill request.     Requested Prescriptions   Pending Prescriptions Disp Refills    carvedilol (COREG) 3.125 MG tablet       Sig: Take 2 tablets (6.25 mg) by mouth every morning , and 3.125 mg by mouth at bedtime   Class: Transitional   Last Office Visit:              2/15/24   Future Office visit:           None    Unable to complete prescription refill per RN Medication Refill Policy.     Routing to covering provider for refill consideration, as PCP/provider is out of clinic >48 hours or Pt is completely out of medication and provider is out of the clinic today.    Pam Zafar RN .............. 5/21/2024  2:44 PM

## 2024-05-21 NOTE — TELEPHONE ENCOUNTER
Reason for call: Medication or medication refill    Have you contacted your pharmacy regarding this refill? Yes    If No, direct the patient to contact the Pharmacy and discontinue telephone note using Erroneous Encounter.  If Yes, continue.    Name of medication requested: carvedilol    How many days of medication do you have left? 5 days    What pharmacy do you use? [facility] by mail    Preferred method for responding to this message: Telephone Call    Phone number patient can be reached at: Home number on file [phone] (home)    If we cannot reach you directly, may we leave a detailed response at the number you provided? Yes       Pt states that he needs a refill for this medication. Says that it has been filled previously by his doctor at [facility], but that since he has started seeing Dr. Mcgowan, he believes the prescription should be going through Dr. Mcgowan now. Pt was informed that the last refill of this Rx on our records was 5/07/2019, and that providers often need to have discussed the medication recently in order to refill it, and his pharmacy may have sent the request to his doctor at [facility]. Pt stated that he has been taking the medication for 15 years so it should not matter.  Pt requests a phone call with updates regarding this refill request.    Socorro Mackenzie on 5/21/2024 at 2:41 PM

## 2024-06-20 ENCOUNTER — ANTICOAGULATION THERAPY VISIT (OUTPATIENT)
Dept: ANTICOAGULATION | Facility: OTHER | Age: 74
End: 2024-06-20
Payer: COMMERCIAL

## 2024-06-20 DIAGNOSIS — I48.11 LONGSTANDING PERSISTENT ATRIAL FIBRILLATION (H): Primary | ICD-10-CM

## 2024-06-20 LAB — INR POINT OF CARE: 2.2 (ref 0.9–1.1)

## 2024-06-20 PROCEDURE — 36416 COLLJ CAPILLARY BLOOD SPEC: CPT | Mod: ZL

## 2024-06-20 NOTE — PROGRESS NOTES
ANTICOAGULATION MANAGEMENT     Juan C Troncoso 74 year old male is on warfarin with therapeutic INR result. (Goal INR 2.0-3.0)    Recent labs: (last 7 days)     06/20/24  0840   INR 2.2*       ASSESSMENT     Source(s): In person     Warfarin doses taken: Warfarin taken as instructed  Diet: No new diet changes identified  New illness, injury, or hospitalization: No  Medication/supplement changes: None noted  Signs or symptoms of bleeding or clotting: No  Previous INR: Therapeutic last 2(+) visits  Additional findings: None     PLAN     Recommended plan for no diet, medication or health factor changes affecting INR     Dosing Instructions: Continue your current warfarin dose with next INR in 2 weeks       Summary  As of 6/20/2024      Full warfarin instructions:  5 mg every Wed; 7.5 mg all other days   Next INR check:  8/1/2024               In person    Lab visit scheduled    Education provided: Please call back if any changes to your diet, medications or how you've been taking warfarin    Plan made per [facility] anticoagulation protocol    Sarai Montano RN  Anticoagulation Clinic  6/20/2024    _______________________________________________________________________     Anticoagulation Episode Summary       Current INR goal:  2.0-3.0   TTR:  100.0% (3.9 mo)   Target end date:  Indefinite   Send INR reminders to:  [facility]    Indications    Longstanding persistent atrial fibrillation (H) [I48.11]             Comments:               Anticoagulation Care Providers       Provider Role Specialty Phone number    Kayode Mcgowan MD Referring Family Medicine [phone]

## 2024-08-01 ENCOUNTER — ANTICOAGULATION THERAPY VISIT (OUTPATIENT)
Dept: ANTICOAGULATION | Facility: OTHER | Age: 74
End: 2024-08-01
Payer: COMMERCIAL

## 2024-08-01 DIAGNOSIS — I48.11 LONGSTANDING PERSISTENT ATRIAL FIBRILLATION (H): Primary | ICD-10-CM

## 2024-08-01 LAB — INR POINT OF CARE: 1.6 (ref 0.9–1.1)

## 2024-08-01 PROCEDURE — 36416 COLLJ CAPILLARY BLOOD SPEC: CPT | Mod: ZL

## 2024-08-01 NOTE — PROGRESS NOTES
ANTICOAGULATION MANAGEMENT     Juan C Troncoso 74 year old male is on warfarin with subtherapeutic INR result. (Goal INR 2.0-3.0)    Recent labs: (last 7 days)     08/01/24  0850   INR 1.6*       ASSESSMENT     Source(s): In person     Warfarin doses taken: Less warfarin taken than planned which may be affecting INR and Missed dose(s) may be affecting INR  Diet: Increased greens/vitamin K in diet; plans to resume previous intake  New illness, injury, or hospitalization: No  Medication/supplement changes: None noted  Signs or symptoms of bleeding or clotting: No  Previous INR: Therapeutic last 2(+) visits  Additional findings: None     PLAN     Recommended plan for temporary change(s) affecting INR     Dosing Instructions: booster dose then continue your current warfarin dose with next INR in 2 weeks       Summary  As of 8/1/2024      Full warfarin instructions:  8/1: 12.5 mg; Otherwise 5 mg every Wed; 7.5 mg all other days   Next INR check:  8/15/2024               In person    Lab visit scheduled    Education provided: Please call back if any changes to your diet, medications or how you've been taking warfarin    Plan made per [facility] anticoagulation protocol    Sarai Montano RN  Anticoagulation Clinic  8/1/2024    _______________________________________________________________________     Anticoagulation Episode Summary       Current INR goal:  2.0-3.0   TTR:  82.3% (5.3 mo)   Target end date:  Indefinite   Send INR reminders to:  [facility]    Indications    Longstanding persistent atrial fibrillation (H) [I48.11]             Comments:               Anticoagulation Care Providers       Provider Role Specialty Phone number    Kayode Mcgowan MD Referring Family Medicine [phone]

## 2024-08-15 ENCOUNTER — ANTICOAGULATION THERAPY VISIT (OUTPATIENT)
Dept: ANTICOAGULATION | Facility: OTHER | Age: 74
End: 2024-08-15
Attending: FAMILY MEDICINE
Payer: COMMERCIAL

## 2024-08-15 DIAGNOSIS — I48.11 LONGSTANDING PERSISTENT ATRIAL FIBRILLATION (H): Primary | ICD-10-CM

## 2024-08-15 LAB — INR POINT OF CARE: 2.5 (ref 0.9–1.1)

## 2024-08-15 PROCEDURE — 36416 COLLJ CAPILLARY BLOOD SPEC: CPT | Mod: ZL

## 2024-08-15 NOTE — PROGRESS NOTES
ANTICOAGULATION MANAGEMENT     Juan C Troncoso 74 year old male is on warfarin with therapeutic INR result. (Goal INR 2.0-3.0)    Recent labs: (last 7 days)     08/15/24  0846   INR 2.5*       ASSESSMENT     Source(s): In person     Warfarin doses taken: Warfarin taken as instructed  Diet: No new diet changes identified  New illness, injury, or hospitalization: No  Medication/supplement changes: None noted  Signs or symptoms of bleeding or clotting: No  Previous INR: Subtherapeutic  Additional findings: None     PLAN     Recommended plan for no diet, medication or health factor changes affecting INR     Dosing Instructions: Continue your current warfarin dose with next INR in 3 weeks       Summary  As of 8/15/2024      Full warfarin instructions:  5 mg every Wed; 7.5 mg all other days   Next INR check:  9/5/2024               In person    Lab visit scheduled    Education provided: Please call back if any changes to your diet, medications or how you've been taking warfarin    Plan made per [facility] anticoagulation protocol    Sarai Montano RN  Anticoagulation Clinic  8/15/2024    _______________________________________________________________________     Anticoagulation Episode Summary       Current INR goal:  2.0-3.0   TTR:  80.1% (5.7 mo)   Target end date:  Indefinite   Send INR reminders to:  [facility]    Indications    Longstanding persistent atrial fibrillation (H) [I48.11]             Comments:               Anticoagulation Care Providers       Provider Role Specialty Phone number    Kayode Mcgowan MD Referring Family Medicine [phone]

## 2024-09-23 ENCOUNTER — TELEPHONE (OUTPATIENT)
Dept: FAMILY MEDICINE | Facility: OTHER | Age: 74
End: 2024-09-23
Payer: COMMERCIAL

## 2024-09-23 NOTE — TELEPHONE ENCOUNTER
ANTICOAGULATION     Juan C Troncoso is overdue for an INR check.     Left message for patient to call and schedule lab appointment as soon as possible. If returning call, please schedule.     Amanda Bangura RN  9/23/2024  Anticoagulation Clinic  [facility] for routing messages: p [facility]  [facility] patient phone line: [phone]

## 2024-09-26 ENCOUNTER — ANTICOAGULATION THERAPY VISIT (OUTPATIENT)
Dept: ANTICOAGULATION | Facility: OTHER | Age: 74
End: 2024-09-26
Payer: COMMERCIAL

## 2024-09-26 DIAGNOSIS — I48.11 HYPERCOAGULABLE STATE DUE TO LONGSTANDING PERSISTENT ATRIAL FIBRILLATION (H): ICD-10-CM

## 2024-09-26 DIAGNOSIS — I10 ESSENTIAL HYPERTENSION, BENIGN: Primary | ICD-10-CM

## 2024-09-26 DIAGNOSIS — I48.11 LONGSTANDING PERSISTENT ATRIAL FIBRILLATION (H): Primary | ICD-10-CM

## 2024-09-26 DIAGNOSIS — D68.69 HYPERCOAGULABLE STATE DUE TO LONGSTANDING PERSISTENT ATRIAL FIBRILLATION (H): ICD-10-CM

## 2024-09-26 DIAGNOSIS — I48.91 ATRIAL FIBRILLATION (H): ICD-10-CM

## 2024-09-26 LAB — INR POINT OF CARE: 1.6 (ref 0.9–1.1)

## 2024-09-26 PROCEDURE — 36416 COLLJ CAPILLARY BLOOD SPEC: CPT | Mod: ZL

## 2024-09-26 RX ORDER — ROSUVASTATIN CALCIUM 10 MG/1
10 TABLET, COATED ORAL DAILY
Qty: 90 TABLET | Refills: 1 | Status: SHIPPED | OUTPATIENT
Start: 2024-09-26

## 2024-09-26 RX ORDER — CARVEDILOL 3.12 MG/1
6.25 TABLET ORAL EVERY MORNING
Qty: 270 TABLET | Refills: 1 | Status: SHIPPED | OUTPATIENT
Start: 2024-09-26

## 2024-09-26 RX ORDER — AMLODIPINE BESYLATE 5 MG/1
5 TABLET ORAL DAILY
Qty: 90 TABLET | Refills: 1 | Status: SHIPPED | OUTPATIENT
Start: 2024-09-26

## 2024-09-26 RX ORDER — WARFARIN SODIUM 5 MG/1
TABLET ORAL
Qty: 120 TABLET | Refills: 1 | Status: SHIPPED | OUTPATIENT
Start: 2024-09-26

## 2024-09-26 NOTE — TELEPHONE ENCOUNTER
"[employer]  sent Rx request for the following:      Requested Prescriptions   Pending Prescriptions Disp Refills    rosuvastatin (CRESTOR) 10 MG tablet       Sig: Take 1 tablet (10 mg) by mouth daily.       Antihyperlipidemic agents Passed - 9/26/2024 11:39 AM   Last Prescription Date:   1/3/24  Last Fill Qty/Refills:         Historical - patient reported  Last Office Visit:              None        amLODIPine (NORVASC) 5 MG tablet       Sig: Take 1 tablet (5 mg) by mouth daily.       Calcium Channel Blockers Protocol  Passed - 9/26/2024 11:39 AM   Last Prescription Date:   9/22/23  Last Fill Qty/Refills:        Historical - patient reported  Last Office Visit:              None        carvedilol (COREG) 3.125 MG tablet 270 tablet 2     Sig: Take 2 tablets (6.25 mg) by mouth every morning. , and (1 tablet) 3.125 mg by mouth at bedtime       Beta-Blockers Protocol Passed - 9/26/2024 11:39 AM             Last Prescription Date:   5/21/24  Last Fill Qty/Refills:         270, R-2    Last Office Visit:              2/15/24  Future Office visit:            None    Unable to complete prescription refill per RN Medication Refill Policy. 2/3 medications are patient reported  Daniella Fountain RN on 9/26/2024 at 12:10 PM           Home

## 2024-09-26 NOTE — PROGRESS NOTES
ANTICOAGULATION MANAGEMENT     Juan C Troncoso 74 year old male is on warfarin with subtherapeutic INR result. (Goal INR 2.0-3.0)    Recent labs: (last 7 days)     09/26/24  0906   INR 1.6*       ASSESSMENT     Source(s): In person     Warfarin doses taken: Warfarin taken as instructed  Diet: Decreased greens/vitamin K in diet; plans to resume previous intake Patient states he has been on vacation and diet has been all over the place. Returning to normal diet now that he is home.   New illness, injury, or hospitalization: No  Medication/supplement changes: None noted  Signs or symptoms of bleeding or clotting: No  Previous INR: Therapeutic last visit; previously outside of goal range  Additional findings: Refill needed today. Juan C meets all criteria for refill (current [facility] referral, visit with referring provider/group in last 15 months unless directed to return in 2 years in last referring provider visit note, lab monitoring up to date or not exceeding 2 weeks overdue). Rx instructions and quantity supplied updated to match patient's current dosing plan. Warfarin 90 day supply with 1 refill granted per [facility] protocol      PLAN     Recommended plan for temporary change(s) affecting INR     Dosing Instructions: booster dose then continue your current warfarin dose with next INR in 2 weeks       Summary  As of 9/26/2024      Full warfarin instructions:  9/26: 12.5 mg; Otherwise 5 mg every Wed; 7.5 mg all other days   Next INR check:  10/10/2024               In person    Lab visit scheduled    Education provided: Please call back if any changes to your diet, medications or how you've been taking warfarin    Plan made per [facility] anticoagulation protocol    Sarai Montano RN  Anticoagulation Clinic  9/26/2024    _______________________________________________________________________     Anticoagulation Episode Summary       Current INR goal:  2.0-3.0   TTR:  75.3% (7.1 mo)   Target end date:  Indefinite   Send INR  reminders to:  [facility]    Indications    Longstanding persistent atrial fibrillation (H) [I48.11]             Comments:               Anticoagulation Care Providers       Provider Role Specialty Phone number    Kayode Mcgowan MD Referring Family Medicine [phone]

## 2024-09-26 NOTE — TELEPHONE ENCOUNTER
Reason for call: Medication or medication refill    Have you contacted your pharmacy regarding this refill? Yes. Per wife, pharmacy has not heard back from [facility].     Name of medication requested: Rosuvastatin, Amlodipine, Warfarin, Carvedilol.     How many days of medication do you have left? Maybe 1 week left of Rosuvastatin and warfarin. The other meds, wife states are ok.    What pharmacy do you use? [facility]    Preferred method for responding to this message: Telephone Call    Phone number patient can be reached at: Cell number on file:    Telephone Information:   Mobile [phone]       If we cannot reach you directly, may we leave a detailed response at the number you provided? Yes    Columab Cantor on 9/26/2024 at 9:47 AM

## 2024-10-10 ENCOUNTER — ANTICOAGULATION THERAPY VISIT (OUTPATIENT)
Dept: ANTICOAGULATION | Facility: OTHER | Age: 74
End: 2024-10-10
Attending: FAMILY MEDICINE
Payer: COMMERCIAL

## 2024-10-10 DIAGNOSIS — I48.11 LONGSTANDING PERSISTENT ATRIAL FIBRILLATION (H): Primary | ICD-10-CM

## 2024-10-10 LAB — INR POINT OF CARE: 3.1 (ref 0.9–1.1)

## 2024-10-10 PROCEDURE — 85610 PROTHROMBIN TIME: CPT | Mod: ZL,QW

## 2024-10-10 NOTE — PROGRESS NOTES
ANTICOAGULATION MANAGEMENT     Juan C Troncoso 74 year old male is on warfarin with supratherapeutic INR result. (Goal INR 2.0-3.0)    Recent labs: (last 7 days)     10/10/24  0834   INR 3.1*       ASSESSMENT     Source(s): Chart Review and in person      Warfarin doses taken: More warfarin taken than planned which may be affecting INR  Diet: No new diet changes identified  Medication/supplement changes: None noted  New illness, injury, or hospitalization: No  Signs or symptoms of bleeding or clotting: No  Previous result: Subtherapeutic  Additional findings:  Has been having some swelling in his feet and ankles.  Patient states he took more warfarin because he was low.        PLAN     Recommended plan for temporary change(s) affecting INR     Dosing Instructions: Continue your current warfarin dose with next INR in 2 weeks       Summary  As of 10/10/2024      Full warfarin instructions:  5 mg every Wed; 7.5 mg all other days   Next INR check:  10/24/2024               In person    Patient elected to schedule next visit 11/12/24 . Informed patient the risks of going 6 weeks and patient verbalized understanding and states he has been doing this for 20 years and wishes to go out 6 weeks.     Education provided: Please call back if any changes to your diet, medications or how you've been taking warfarin    Plan made per [facility] anticoagulation protocol    Enrique Tamayo RN  10/10/2024  Anticoagulation Clinic  [employer] for routing messages: p [facility]          _______________________________________________________________________     Anticoagulation Episode Summary       Current INR goal:  2.0-3.0   TTR:  74.8% (7.6 mo)   Target end date:  Indefinite   Send INR reminders to:  [facility]    Indications    Longstanding persistent atrial fibrillation (H) [I48.11]             Comments:               Anticoagulation Care Providers       Provider Role Specialty Phone number    Kayode Mcgowan MD  [facility] Family [facility] [phone]

## 2024-11-21 ENCOUNTER — ANTICOAGULATION THERAPY VISIT (OUTPATIENT)
Dept: ANTICOAGULATION | Facility: OTHER | Age: 74
End: 2024-11-21
Attending: FAMILY MEDICINE
Payer: COMMERCIAL

## 2024-11-21 DIAGNOSIS — I48.11 LONGSTANDING PERSISTENT ATRIAL FIBRILLATION (H): Primary | ICD-10-CM

## 2024-11-21 LAB — INR POINT OF CARE: 2.6 (ref 0.9–1.1)

## 2024-11-21 PROCEDURE — 85610 PROTHROMBIN TIME: CPT | Mod: ZL,QW

## 2024-11-21 PROCEDURE — 36416 COLLJ CAPILLARY BLOOD SPEC: CPT | Mod: ZL

## 2024-11-21 NOTE — PROGRESS NOTES
ANTICOAGULATION MANAGEMENT     Juan C Troncoso 74 year old male is on warfarin with therapeutic INR result. (Goal INR 2.0-3.0)    Recent labs: (last 7 days)     11/21/24  0838   INR 2.6*       ASSESSMENT     Source(s): In person     Warfarin doses taken: Warfarin taken as instructed  Diet: No new diet changes identified  New illness, injury, or hospitalization: No  Medication/supplement changes: None noted  Signs or symptoms of bleeding or clotting: No  Previous INR: Supratherapeutic  Additional findings: None     PLAN     Recommended plan for no diet, medication or health factor changes affecting INR     Dosing Instructions: Continue your current warfarin dose with next INR in 3 weeks       Summary  As of 11/21/2024      Full warfarin instructions:  5 mg every Wed; 7.5 mg all other days   Next INR check:  12/12/2024               In person    Patient offered & declined to schedule next visit    Education provided: Please call back if any changes to your diet, medications or how you've been taking warfarin    Plan made per [facility] anticoagulation protocol    Sarai Montano RN  Anticoagulation Clinic  11/21/2024    _______________________________________________________________________     Anticoagulation Episode Summary       Current INR goal:  2.0-3.0   TTR:  75.6% (9 mo)   Target end date:  Indefinite   Send INR reminders to:  [facility]    Indications    Longstanding persistent atrial fibrillation (H) [I48.11]             Comments:  --             Anticoagulation Care Providers       Provider Role Specialty Phone number    Kayode Mcgowan MD Referring Family Medicine [phone]

## 2024-12-04 DIAGNOSIS — I48.91 ATRIAL FIBRILLATION (H): ICD-10-CM

## 2024-12-04 DIAGNOSIS — D68.69 HYPERCOAGULABLE STATE DUE TO LONGSTANDING PERSISTENT ATRIAL FIBRILLATION (H): ICD-10-CM

## 2024-12-04 DIAGNOSIS — I48.11 LONGSTANDING PERSISTENT ATRIAL FIBRILLATION (H): ICD-10-CM

## 2024-12-04 DIAGNOSIS — I48.11 HYPERCOAGULABLE STATE DUE TO LONGSTANDING PERSISTENT ATRIAL FIBRILLATION (H): ICD-10-CM

## 2024-12-04 DIAGNOSIS — I10 ESSENTIAL HYPERTENSION, BENIGN: ICD-10-CM

## 2024-12-05 RX ORDER — WARFARIN SODIUM 5 MG/1
TABLET ORAL
Qty: 120 TABLET | Refills: 1 | Status: SHIPPED | OUTPATIENT
Start: 2024-12-05

## 2024-12-05 RX ORDER — ROSUVASTATIN CALCIUM 10 MG/1
10 TABLET, COATED ORAL DAILY
Qty: 100 TABLET | Refills: 2 | OUTPATIENT
Start: 2024-12-05

## 2024-12-05 RX ORDER — AMLODIPINE BESYLATE 5 MG/1
5 TABLET ORAL DAILY
Qty: 100 TABLET | Refills: 2 | OUTPATIENT
Start: 2024-12-05

## 2024-12-05 NOTE — TELEPHONE ENCOUNTER
[facility] sent Rx request for the following:    Redundant refill request refused: Too soon:  amLODIPine (NORVASC) 5 MG tablet 90 tablet 1 9/26/2024 -- No   Sig - Route: Take 1 tablet (5 mg) by mouth daily. - Oral   Sent to pharmacy as: amLODIPine Besylate 5 MG Oral Tablet (NORVASC)   Class: E-Prescribe   Order: [id number]   E-Prescribing Status: Receipt confirmed by pharmacy (9/26/2024  1:15 PM CDT)     rosuvastatin (CRESTOR) 10 MG tablet 90 tablet 1 9/26/2024 -- No   Sig - Route: Take 1 tablet (10 mg) by mouth daily. - Oral   Sent to pharmacy as: Rosuvastatin Calcium 10 MG Oral Tablet (CRESTOR)   Class: E-Prescribe   Order: [id number]   E-Prescribing Status: Receipt confirmed by pharmacy (9/26/2024  1:15 PM CDT)     [facility] - [address]     Pam Zafar RN .............. 12/5/2024  11:32 AM

## 2024-12-05 NOTE — TELEPHONE ENCOUNTER
ANTICOAGULATION MANAGEMENT:  Medication Refill    Anticoagulation Summary  As of 11/21/2024      Warfarin maintenance plan:  5 mg (5 mg x 1) every Wed; 7.5 mg (5 mg x 1.5) all other days   Next INR check:  12/12/2024   Target end date:  Indefinite    Indications    Longstanding persistent atrial fibrillation (H) [I48.11]                 Anticoagulation Care Providers       Provider Role Specialty Phone number    Kayode Mcgowan MD Referring Family Medicine [phone]            Visit with referring provider/group within last year: Yes    [facility] referral signed within last year: Yes    Juan C meets all criteria for refill (current [facility] referral, visit with referring provider/group in last 15 months unless directed to return in 2 years in last referring provider visit note, lab monitoring up to date or not exceeding 2 weeks overdue). Rx instructions and quantity match patient's current dosing plan. Warfarin 90 day supply with 1 refill granted per [facility] protocol     Sarai Montano RN  Anticoagulation Clinic

## 2024-12-26 ENCOUNTER — ANTICOAGULATION THERAPY VISIT (OUTPATIENT)
Dept: ANTICOAGULATION | Facility: OTHER | Age: 74
End: 2024-12-26
Attending: FAMILY MEDICINE
Payer: COMMERCIAL

## 2024-12-26 DIAGNOSIS — I48.11 LONGSTANDING PERSISTENT ATRIAL FIBRILLATION (H): Primary | ICD-10-CM

## 2024-12-26 LAB — INR POINT OF CARE: 2.5 (ref 0.9–1.1)

## 2024-12-26 PROCEDURE — 85610 PROTHROMBIN TIME: CPT | Mod: ZL,QW

## 2024-12-26 NOTE — PROGRESS NOTES
ANTICOAGULATION MANAGEMENT     Juan C Troncoso 74 year old male is on warfarin with therapeutic INR result. (Goal INR 2.0-3.0)    Recent labs: (last 7 days)     12/26/24  0852   INR 2.5*       ASSESSMENT     Source(s): Chart Review and In person      Warfarin doses taken: Warfarin taken as instructed  Diet: No new diet changes identified  New illness, injury, or hospitalization: No  Medication/supplement changes: None noted  Signs or symptoms of bleeding or clotting: No  Previous INR: Therapeutic last visit; previously outside of goal range  Additional findings: None       PLAN     Recommended plan for no diet, medication or health factor changes affecting INR     Dosing Instructions: Continue your current warfarin dose with next INR in 6 weeks       Summary  As of 12/26/2024      Full warfarin instructions:  5 mg every Wed; 7.5 mg all other days   Next INR check:  2/6/2025               In person    Lab visit scheduled    Education provided: Please call back if any changes to your diet, medications or how you've been taking warfarin    Plan made per [facility] anticoagulation protocol    Amanda Bangura RN  Anticoagulation Clinic  12/26/2024    _______________________________________________________________________     Anticoagulation Episode Summary       Current INR goal:  2.0-3.0   TTR:  78.4% (10.2 mo)   Target end date:  Indefinite   Send INR reminders to:  [facility]    Indications    Longstanding persistent atrial fibrillation (H) [I48.11]             Comments:  --             Anticoagulation Care Providers       Provider Role Specialty Phone number    Kayode Mcgowan MD Referring Family Medicine [phone]

## 2025-01-06 DIAGNOSIS — I10 ESSENTIAL HYPERTENSION, BENIGN: ICD-10-CM

## 2025-01-08 RX ORDER — ROSUVASTATIN CALCIUM 10 MG/1
10 TABLET, COATED ORAL DAILY
Qty: 100 TABLET | Refills: 2 | OUTPATIENT
Start: 2025-01-08

## 2025-01-08 NOTE — TELEPHONE ENCOUNTER
[facility] sent Rx request for the following:      Redundant refill request refused: Too soon:  rosuvastatin (CRESTOR) 10 MG tablet 90 tablet 1 9/26/2024 -- No   Sig - Route: Take 1 tablet (10 mg) by mouth daily. - Oral   Sent to pharmacy as: Rosuvastatin Calcium 10 MG Oral Tablet (CRESTOR)   Class: E-Prescribe   Order: [id number]   E-Prescribing Status: Receipt confirmed by pharmacy (9/26/2024  1:15 PM CDT)     Printout Tracking    External Result Report     Pharmacy    [facility] - [address]     Pam Zafar RN .............. 1/8/2025  8:42 AM

## 2025-01-12 DIAGNOSIS — I10 ESSENTIAL HYPERTENSION, BENIGN: ICD-10-CM

## 2025-01-13 DIAGNOSIS — I10 ESSENTIAL HYPERTENSION, BENIGN: ICD-10-CM

## 2025-01-14 RX ORDER — CARVEDILOL 3.12 MG/1
TABLET ORAL
Qty: 300 TABLET | Refills: 2 | OUTPATIENT
Start: 2025-01-14

## 2025-01-14 RX ORDER — ROSUVASTATIN CALCIUM 10 MG/1
10 TABLET, COATED ORAL DAILY
Qty: 80 TABLET | Refills: 3 | OUTPATIENT
Start: 2025-01-14

## 2025-01-14 NOTE — TELEPHONE ENCOUNTER
[facility] sent Rx request for the following:      Redundant refill request refused: Too soon:  carvedilol (COREG) 3.125 MG tablet 270 tablet 1 9/26/2024 -- No   Sig - Route: Take 2 tablets (6.25 mg) by mouth every morning. , and (1 tablet) 3.125 mg by mouth at bedtime - Oral   Sent to pharmacy as: Carvedilol 3.125 MG Oral Tablet (COREG)   Class: E-Prescribe   Order: [id number]   E-Prescribing Status: Receipt confirmed by pharmacy (9/26/2024  1:16 PM CDT)     Printout Tracking    External Result Report     Medication Administration Instructions    , and (1 tablet) 3.125 mg by mouth at bedtime     Pharmacy    [facility] - [address]   Pam Zafar RN .............. 1/14/2025  4:04 PM

## 2025-01-14 NOTE — TELEPHONE ENCOUNTER
[facility] sent Rx request for the following:      Redundant refill request refused: Too soon:  rosuvastatin (CRESTOR) 10 MG tablet 90 tablet 1 9/26/2024 -- No   Sig - Route: Take 1 tablet (10 mg) by mouth daily. - Oral   Sent to pharmacy as: Rosuvastatin Calcium 10 MG Oral Tablet (CRESTOR)   Class: E-Prescribe   Order: [id number]   E-Prescribing Status: Receipt confirmed by pharmacy (9/26/2024  1:15 PM CDT)     Printout Tracking    External Result Report     Pharmacy    [facility] - [address]   Pam Zafar RN .............. 1/14/2025  3:10 PM

## 2025-01-29 ENCOUNTER — TRANSFERRED RECORDS (OUTPATIENT)
Dept: HEALTH INFORMATION MANAGEMENT | Facility: OTHER | Age: 75
End: 2025-01-29

## 2025-02-06 ENCOUNTER — ANTICOAGULATION THERAPY VISIT (OUTPATIENT)
Dept: ANTICOAGULATION | Facility: OTHER | Age: 75
End: 2025-02-06
Attending: FAMILY MEDICINE
Payer: COMMERCIAL

## 2025-02-06 DIAGNOSIS — I48.11 LONGSTANDING PERSISTENT ATRIAL FIBRILLATION (H): Primary | ICD-10-CM

## 2025-02-06 LAB — INR POINT OF CARE: 2.6 (ref 0.9–1.1)

## 2025-02-06 PROCEDURE — 85610 PROTHROMBIN TIME: CPT | Mod: ZL,QW

## 2025-02-06 PROCEDURE — 36416 COLLJ CAPILLARY BLOOD SPEC: CPT | Mod: ZL

## 2025-02-06 NOTE — PROGRESS NOTES
ANTICOAGULATION MANAGEMENT     Juan C Troncoso 74 year old male is on warfarin with therapeutic INR result. (Goal INR 2.0-3.0)    Recent labs: (last 7 days)     02/06/25  0838   INR 2.6*       ASSESSMENT     Source(s): Chart Review and in person      Warfarin doses taken: Warfarin taken as instructed  Diet: No new diet changes identified  New illness, injury, or hospitalization: No  Medication/supplement changes: None noted  Signs or symptoms of bleeding or clotting: No  Previous INR: Therapeutic last 2(+) visits  Additional findings: None       PLAN     Recommended plan for no diet, medication or health factor changes affecting INR     Dosing Instructions: Continue your current warfarin dose with next INR in 6 weeks       Summary  As of 2/6/2025      Full warfarin instructions:  5 mg every Wed; 7.5 mg all other days   Next INR check:  3/20/2025               Instructions given in person, patient verbalizes understanding.      Lab visit scheduled    Education provided: Please call back if any changes to your diet, medications or how you've been taking warfarin    Plan made per [facility] anticoagulation protocol    Sarai Montano RN  Anticoagulation Clinic  2/6/2025    _______________________________________________________________________     Anticoagulation Episode Summary       Current INR goal:  2.0-3.0   TTR:  81.0% (11.6 mo)   Target end date:  Indefinite   Send INR reminders to:  [facility]    Indications    Longstanding persistent atrial fibrillation (H) [I48.11]             Comments:  --             Anticoagulation Care Providers       Provider Role Specialty Phone number    Kayode Mcgowan MD Referring Family Medicine [phone]              
ED MD

## 2025-02-25 DIAGNOSIS — I48.11 LONGSTANDING PERSISTENT ATRIAL FIBRILLATION (H): ICD-10-CM

## 2025-02-25 RX ORDER — WARFARIN SODIUM 5 MG/1
TABLET ORAL
Qty: 130 TABLET | Refills: 0 | Status: SHIPPED | OUTPATIENT
Start: 2025-02-25

## 2025-02-25 NOTE — TELEPHONE ENCOUNTER
ANTICOAGULATION MANAGEMENT:  Medication Refill    Anticoagulation Summary  As of 2/6/2025      Warfarin maintenance plan:  5 mg (5 mg x 1) every Wed; 7.5 mg (5 mg x 1.5) all other days   Next INR check:  3/20/2025   Target end date:  Indefinite    Indications    Longstanding persistent atrial fibrillation (H) [I48.11]                 Anticoagulation Care Providers       Provider Role Specialty Phone number    Kayode Mcgowan MD Referring Family Medicine [phone]            Visit with referring provider/group within last year: No, last visit date: 2/15/24      [facility] referral signed within last year: Yes    Juan C does NOT meet all criteria for refill: Visit with referring provider's group was >= 12 months ago. Refill routed to PCP for approval per [facility] protocol as well as patient wanting a 1 year supply, PCP needs to sign    Amanda Bangura RN  Anticoagulation Clinic  '

## 2025-03-08 DIAGNOSIS — I10 ESSENTIAL HYPERTENSION, BENIGN: ICD-10-CM

## 2025-03-10 RX ORDER — ROSUVASTATIN CALCIUM 10 MG/1
10 TABLET, COATED ORAL DAILY
Qty: 80 TABLET | Refills: 0 | Status: SHIPPED | OUTPATIENT
Start: 2025-03-10

## 2025-03-10 NOTE — TELEPHONE ENCOUNTER
[facility] sent Rx request for the following:      Requested Prescriptions   Pending Prescriptions Disp Refills    rosuvastatin (CRESTOR) 10 MG tablet [Pharmacy Med Name: Rosuvastatin Calcium 10 MG Oral Tablet] 80 tablet 3     Sig: TAKE 1 TABLET BY MOUTH DAILY       Antihyperlipidemic agents Failed - 3/10/2025 12:06 PM        Failed - LDL on file in the past 12 months        Failed - Recent (12 mo) or future (90 days) visit within the authorizing provider's specialty     Last Prescription Date:   9/26/24  Last Fill Qty/Refills:         90, R-1    Last Office Visit:              2/15/24 (Px)   Future Office visit:           None    Pt due for annual exam. Routing to provider for refill consideration. Routing to Unit scheduling pool, to assist Pt in scheduling appointment. Unable to complete prescription refill per RN Medication Refill Policy. Pam Zafar RN .............. 3/10/2025  12:23 PM

## 2025-03-19 DIAGNOSIS — I10 ESSENTIAL HYPERTENSION, BENIGN: ICD-10-CM

## 2025-03-19 RX ORDER — AMLODIPINE BESYLATE 5 MG/1
5 TABLET ORAL DAILY
Qty: 80 TABLET | Refills: 3 | OUTPATIENT
Start: 2025-03-19

## 2025-03-19 NOTE — TELEPHONE ENCOUNTER
[facility] - [place], [place] sent Rx request for the following:      Requested Prescriptions   Pending Prescriptions Disp Refills    amLODIPine (NORVASC) 5 MG tablet [Pharmacy Med Name: amLODIPine Besylate 5 MG Oral Tablet] 80 tablet 3     Sig: TAKE 1 TABLET BY MOUTH DAILY       Calcium Channel Blockers Protocol  Failed - 3/19/2025 11:52 AM      Last Prescription Date:   09/26/2024  Last Fill Qty/Refills:         90, R-1    Last Office Visit:              07/17/2023   Future Office visit:           None   Unable to complete prescription refill per RN Medication Refill Policy.    Luda Mata RN on 3/19/2025 at 12:00 PM

## 2025-03-20 ENCOUNTER — ANTICOAGULATION THERAPY VISIT (OUTPATIENT)
Dept: ANTICOAGULATION | Facility: OTHER | Age: 75
End: 2025-03-20
Attending: FAMILY MEDICINE
Payer: COMMERCIAL

## 2025-03-20 DIAGNOSIS — I48.11 LONGSTANDING PERSISTENT ATRIAL FIBRILLATION (H): Primary | ICD-10-CM

## 2025-03-20 LAB — INR POINT OF CARE: 2 (ref 0.9–1.1)

## 2025-03-20 PROCEDURE — 36416 COLLJ CAPILLARY BLOOD SPEC: CPT | Mod: ZL

## 2025-03-20 PROCEDURE — 85610 PROTHROMBIN TIME: CPT | Mod: ZL,QW

## 2025-03-20 NOTE — TELEPHONE ENCOUNTER
Called and notified patient that he is due for a physical. Мария Edwards LPN .......................3/20/2025  10:49 AM

## 2025-03-20 NOTE — PROGRESS NOTES
ANTICOAGULATION MANAGEMENT     Juan C Troncoso 74 year old male is on warfarin with therapeutic INR result. (Goal INR 2.0-3.0)    Recent labs: (last 7 days)     03/20/25  0831   INR 2.0*       ASSESSMENT     Source(s): Chart Review and In person      Warfarin doses taken: Warfarin taken as instructed  Diet: Increased greens/vitamin K in diet; plans to resume previous intake  New illness, injury, or hospitalization: No  Medication/supplement changes: None noted  Signs or symptoms of bleeding or clotting: No  Previous INR: Therapeutic last 2(+) visits  Additional findings: None       PLAN     Recommended plan for no diet, medication or health factor changes affecting INR     Dosing Instructions: Continue your current warfarin dose with next INR in 6 weeks       Summary  As of 3/20/2025      Full warfarin instructions:  5 mg every Wed; 7.5 mg all other days   Next INR check:  5/1/2025               In person    Lab visit scheduled    Education provided: Please call back if any changes to your diet, medications or how you've been taking warfarin    Plan made per [facility] anticoagulation protocol    Amanda Bangura RN  Anticoagulation Clinic  3/20/2025    _______________________________________________________________________     Anticoagulation Episode Summary       Current INR goal:  2.0-3.0   TTR:  81.9% (1 y)   Target end date:  Indefinite   Send INR reminders to:  [facility]    Indications    Longstanding persistent atrial fibrillation (H) [I48.11]             Comments:  --             Anticoagulation Care Providers       Provider Role Specialty Phone number    Kayode Mcgowan MD Referring Family Medicine [phone]

## 2025-03-25 PROBLEM — I10 BENIGN ESSENTIAL HYPERTENSION: Status: ACTIVE | Noted: 2025-03-25

## 2025-03-25 PROBLEM — E78.2 MIXED HYPERLIPIDEMIA: Status: ACTIVE | Noted: 2025-03-25

## 2025-03-25 PROBLEM — Z79.01 WARFARIN ANTICOAGULATION: Status: ACTIVE | Noted: 2025-03-25

## 2025-03-26 ENCOUNTER — OFFICE VISIT (OUTPATIENT)
Dept: INTERNAL MEDICINE | Facility: OTHER | Age: 75
End: 2025-03-26
Attending: INTERNAL MEDICINE
Payer: COMMERCIAL

## 2025-03-26 VITALS
WEIGHT: 272 LBS | TEMPERATURE: 96.9 F | OXYGEN SATURATION: 97 % | DIASTOLIC BLOOD PRESSURE: 84 MMHG | HEART RATE: 67 BPM | RESPIRATION RATE: 16 BRPM | HEIGHT: 72 IN | SYSTOLIC BLOOD PRESSURE: 136 MMHG | BODY MASS INDEX: 36.84 KG/M2

## 2025-03-26 DIAGNOSIS — Z11.3 SCREEN FOR STD (SEXUALLY TRANSMITTED DISEASE): ICD-10-CM

## 2025-03-26 DIAGNOSIS — Z12.5 ENCOUNTER FOR SCREENING FOR MALIGNANT NEOPLASM OF PROSTATE: ICD-10-CM

## 2025-03-26 DIAGNOSIS — E78.2 MIXED HYPERLIPIDEMIA: ICD-10-CM

## 2025-03-26 DIAGNOSIS — Z00.00 MEDICARE ANNUAL WELLNESS VISIT, SUBSEQUENT: Primary | ICD-10-CM

## 2025-03-26 DIAGNOSIS — Z71.85 VACCINE COUNSELING: ICD-10-CM

## 2025-03-26 DIAGNOSIS — R35.0 BENIGN PROSTATIC HYPERPLASIA WITH URINARY FREQUENCY: ICD-10-CM

## 2025-03-26 DIAGNOSIS — I48.11 HYPERCOAGULABLE STATE DUE TO LONGSTANDING PERSISTENT ATRIAL FIBRILLATION (H): ICD-10-CM

## 2025-03-26 DIAGNOSIS — N40.1 NOCTURIA ASSOCIATED WITH BENIGN PROSTATIC HYPERPLASIA: ICD-10-CM

## 2025-03-26 DIAGNOSIS — D68.69 HYPERCOAGULABLE STATE DUE TO LONGSTANDING PERSISTENT ATRIAL FIBRILLATION (H): ICD-10-CM

## 2025-03-26 DIAGNOSIS — J43.9 PULMONARY EMPHYSEMA, UNSPECIFIED EMPHYSEMA TYPE (H): ICD-10-CM

## 2025-03-26 DIAGNOSIS — R35.1 NOCTURIA ASSOCIATED WITH BENIGN PROSTATIC HYPERPLASIA: ICD-10-CM

## 2025-03-26 DIAGNOSIS — E66.812 CLASS 2 SEVERE OBESITY DUE TO EXCESS CALORIES WITH SERIOUS COMORBIDITY AND BODY MASS INDEX (BMI) OF 36.0 TO 36.9 IN ADULT (H): ICD-10-CM

## 2025-03-26 DIAGNOSIS — Z77.29 EXPOSURE TO SILICA: ICD-10-CM

## 2025-03-26 DIAGNOSIS — Z87.891 HISTORY OF TOBACCO ABUSE: ICD-10-CM

## 2025-03-26 DIAGNOSIS — Z79.01 WARFARIN ANTICOAGULATION: ICD-10-CM

## 2025-03-26 DIAGNOSIS — I10 BENIGN ESSENTIAL HYPERTENSION: ICD-10-CM

## 2025-03-26 DIAGNOSIS — Z87.891 PERSONAL HISTORY OF TOBACCO USE: ICD-10-CM

## 2025-03-26 DIAGNOSIS — E66.01 CLASS 2 SEVERE OBESITY DUE TO EXCESS CALORIES WITH SERIOUS COMORBIDITY AND BODY MASS INDEX (BMI) OF 36.0 TO 36.9 IN ADULT (H): ICD-10-CM

## 2025-03-26 DIAGNOSIS — Z77.090 ASBESTOS EXPOSURE: ICD-10-CM

## 2025-03-26 DIAGNOSIS — N40.1 BENIGN PROSTATIC HYPERPLASIA WITH URINARY FREQUENCY: ICD-10-CM

## 2025-03-26 LAB
ALBUMIN SERPL BCG-MCNC: 4.4 G/DL (ref 3.5–5.2)
ALBUMIN UR-MCNC: NEGATIVE MG/DL
ALP SERPL-CCNC: 61 U/L (ref 40–150)
ALT SERPL W P-5'-P-CCNC: 13 U/L (ref 0–70)
ANION GAP SERPL CALCULATED.3IONS-SCNC: 11 MMOL/L (ref 7–15)
APPEARANCE UR: CLEAR
AST SERPL W P-5'-P-CCNC: 17 U/L (ref 0–45)
BACTERIA #/AREA URNS HPF: ABNORMAL /HPF
BASOPHILS # BLD AUTO: 0.1 10E3/UL (ref 0–0.2)
BASOPHILS NFR BLD AUTO: 1 %
BILIRUB SERPL-MCNC: 0.9 MG/DL
BILIRUB UR QL STRIP: NEGATIVE
BUN SERPL-MCNC: 15.1 MG/DL (ref 8–23)
C TRACH DNA SPEC QL PROBE+SIG AMP: NEGATIVE
CALCIUM SERPL-MCNC: 9.3 MG/DL (ref 8.8–10.4)
CHLORIDE SERPL-SCNC: 100 MMOL/L (ref 98–107)
CHOLEST SERPL-MCNC: 165 MG/DL
COLOR UR AUTO: YELLOW
CREAT SERPL-MCNC: 0.79 MG/DL (ref 0.67–1.17)
EGFRCR SERPLBLD CKD-EPI 2021: >90 ML/MIN/1.73M2
EOSINOPHIL # BLD AUTO: 0.2 10E3/UL (ref 0–0.7)
EOSINOPHIL NFR BLD AUTO: 4 %
ERYTHROCYTE [DISTWIDTH] IN BLOOD BY AUTOMATED COUNT: 13.1 % (ref 10–15)
FASTING STATUS PATIENT QL REPORTED: YES
FASTING STATUS PATIENT QL REPORTED: YES
GLUCOSE SERPL-MCNC: 127 MG/DL (ref 70–99)
GLUCOSE UR STRIP-MCNC: NEGATIVE MG/DL
HCO3 SERPL-SCNC: 25 MMOL/L (ref 22–29)
HCT VFR BLD AUTO: 46.3 % (ref 40–53)
HCV AB SERPL QL IA: NONREACTIVE
HDLC SERPL-MCNC: 83 MG/DL
HGB BLD-MCNC: 15.9 G/DL (ref 13.3–17.7)
HGB UR QL STRIP: NEGATIVE
HIV 1+2 AB+HIV1 P24 AG SERPL QL IA: NONREACTIVE
IMM GRANULOCYTES # BLD: 0 10E3/UL
IMM GRANULOCYTES NFR BLD: 0 %
KETONES UR STRIP-MCNC: NEGATIVE MG/DL
LDLC SERPL CALC-MCNC: 62 MG/DL
LEUKOCYTE ESTERASE UR QL STRIP: NEGATIVE
LYMPHOCYTES # BLD AUTO: 1.7 10E3/UL (ref 0.8–5.3)
LYMPHOCYTES NFR BLD AUTO: 27 %
MCH RBC QN AUTO: 31.8 PG (ref 26.5–33)
MCHC RBC AUTO-ENTMCNC: 34.3 G/DL (ref 31.5–36.5)
MCV RBC AUTO: 93 FL (ref 78–100)
MONOCYTES # BLD AUTO: 0.7 10E3/UL (ref 0–1.3)
MONOCYTES NFR BLD AUTO: 12 %
N GONORRHOEA DNA SPEC QL NAA+PROBE: NEGATIVE
NEUTROPHILS # BLD AUTO: 3.5 10E3/UL (ref 1.6–8.3)
NEUTROPHILS NFR BLD AUTO: 56 %
NITRATE UR QL: NEGATIVE
NONHDLC SERPL-MCNC: 82 MG/DL
NRBC # BLD AUTO: 0 10E3/UL
NRBC BLD AUTO-RTO: 0 /100
PH UR STRIP: 7 [PH] (ref 5–9)
PLATELET # BLD AUTO: 180 10E3/UL (ref 150–450)
POTASSIUM SERPL-SCNC: 4.3 MMOL/L (ref 3.4–5.3)
PROT SERPL-MCNC: 7.4 G/DL (ref 6.4–8.3)
PSA SERPL DL<=0.01 NG/ML-MCNC: 1.51 NG/ML (ref 0–6.5)
RBC # BLD AUTO: 5 10E6/UL (ref 4.4–5.9)
RBC URINE: 2 /HPF
SODIUM SERPL-SCNC: 136 MMOL/L (ref 135–145)
SP GR UR STRIP: 1.01 (ref 1–1.03)
SPECIMEN TYPE: NORMAL
TRIGL SERPL-MCNC: 102 MG/DL
UROBILINOGEN UR STRIP-MCNC: NORMAL MG/DL
WBC # BLD AUTO: 6.2 10E3/UL (ref 4–11)
WBC URINE: <1 /HPF

## 2025-03-26 PROCEDURE — 80053 COMPREHEN METABOLIC PANEL: CPT | Mod: ZL | Performed by: INTERNAL MEDICINE

## 2025-03-26 PROCEDURE — 86803 HEPATITIS C AB TEST: CPT | Mod: ZL | Performed by: INTERNAL MEDICINE

## 2025-03-26 PROCEDURE — 85025 COMPLETE CBC W/AUTO DIFF WBC: CPT | Mod: ZL | Performed by: INTERNAL MEDICINE

## 2025-03-26 PROCEDURE — 87491 CHLMYD TRACH DNA AMP PROBE: CPT | Mod: ZL | Performed by: INTERNAL MEDICINE

## 2025-03-26 PROCEDURE — 87389 HIV-1 AG W/HIV-1&-2 AB AG IA: CPT | Mod: ZL | Performed by: INTERNAL MEDICINE

## 2025-03-26 PROCEDURE — 86780 TREPONEMA PALLIDUM: CPT | Mod: ZL | Performed by: INTERNAL MEDICINE

## 2025-03-26 PROCEDURE — 80061 LIPID PANEL: CPT | Mod: ZL | Performed by: INTERNAL MEDICINE

## 2025-03-26 PROCEDURE — G0296 VISIT TO DETERM LDCT ELIG: HCPCS | Performed by: INTERNAL MEDICINE

## 2025-03-26 PROCEDURE — 81001 URINALYSIS AUTO W/SCOPE: CPT | Mod: ZL | Performed by: INTERNAL MEDICINE

## 2025-03-26 PROCEDURE — G0463 HOSPITAL OUTPT CLINIC VISIT: HCPCS | Mod: 25

## 2025-03-26 PROCEDURE — G0103 PSA SCREENING: HCPCS | Mod: ZL | Performed by: INTERNAL MEDICINE

## 2025-03-26 PROCEDURE — 36415 COLL VENOUS BLD VENIPUNCTURE: CPT | Mod: ZL | Performed by: INTERNAL MEDICINE

## 2025-03-26 RX ORDER — WARFARIN SODIUM 5 MG/1
TABLET ORAL
COMMUNITY
Start: 2023-09-22 | End: 2025-03-26

## 2025-03-26 RX ORDER — ROSUVASTATIN CALCIUM 10 MG/1
10 TABLET, COATED ORAL DAILY
Qty: 90 TABLET | Refills: 4 | Status: SHIPPED | OUTPATIENT
Start: 2025-03-26

## 2025-03-26 RX ORDER — CARVEDILOL 6.25 MG/1
6.25 TABLET ORAL EVERY MORNING
Qty: 90 TABLET | Refills: 4 | Status: SHIPPED | OUTPATIENT
Start: 2025-03-26

## 2025-03-26 RX ORDER — CARVEDILOL 3.12 MG/1
6.25 TABLET ORAL EVERY EVENING
Qty: 90 TABLET | Refills: 4 | Status: SHIPPED | OUTPATIENT
Start: 2025-03-26

## 2025-03-26 RX ORDER — WARFARIN SODIUM 5 MG/1
TABLET ORAL
Qty: 200 TABLET | Refills: 4 | Status: SHIPPED | OUTPATIENT
Start: 2025-03-26

## 2025-03-26 RX ORDER — AMLODIPINE BESYLATE 5 MG/1
5 TABLET ORAL DAILY
Qty: 90 TABLET | Refills: 4 | Status: SHIPPED | OUTPATIENT
Start: 2025-03-26

## 2025-03-26 SDOH — HEALTH STABILITY: PHYSICAL HEALTH: ON AVERAGE, HOW MANY DAYS PER WEEK DO YOU ENGAGE IN MODERATE TO STRENUOUS EXERCISE (LIKE A BRISK WALK)?: 2 DAYS

## 2025-03-26 ASSESSMENT — ENCOUNTER SYMPTOMS
SHORTNESS OF BREATH: 0
HEMATURIA: 0
NAUSEA: 0
DYSURIA: 0
AGITATION: 0
COUGH: 0
WOUND: 0
DIZZINESS: 0
CHILLS: 0
BRUISES/BLEEDS EASILY: 1
ABDOMINAL PAIN: 0
WHEEZING: 0
DIARRHEA: 0
CONFUSION: 0
ARTHRALGIAS: 0
MYALGIAS: 0
LIGHT-HEADEDNESS: 0
FEVER: 0
VOMITING: 0

## 2025-03-26 ASSESSMENT — SOCIAL DETERMINANTS OF HEALTH (SDOH): HOW OFTEN DO YOU GET TOGETHER WITH FRIENDS OR RELATIVES?: ONCE A WEEK

## 2025-03-26 ASSESSMENT — PAIN SCALES - GENERAL: PAINLEVEL_OUTOF10: NO PAIN (0)

## 2025-03-26 NOTE — LETTER
Problem: Discharge Planning  Goal: Discharge to home or other facility with appropriate resources  Outcome: Progressing  Flowsheets (Taken 7/30/2024 2000)  Discharge to home or other facility with appropriate resources: Identify barriers to discharge with patient and caregiver     Problem: Safety - Adult  Goal: Free from fall injury  Outcome: Progressing     Problem: ABCDS Injury Assessment  Goal: Absence of physical injury  Outcome: Progressing     Problem: Neurosensory - Adult  Goal: Achieves stable or improved neurological status  Outcome: Progressing  Flowsheets (Taken 7/30/2024 2000)  Achieves stable or improved neurological status: Assess for and report changes in neurological status     Problem: Respiratory - Adult  Goal: Achieves optimal ventilation and oxygenation  Outcome: Progressing     Problem: Skin/Tissue Integrity - Adult  Goal: Incisions, wounds, or drain sites healing without S/S of infection  Outcome: Progressing  Flowsheets (Taken 7/30/2024 2000)  Incisions, Wounds, or Drain Sites Healing Without Sign and Symptoms of Infection: ADMISSION and DAILY: Assess and document risk factors for pressure ulcer development      March 27, 2025      Juan C JAKOB Troncoso  [address] [id number]        Dear ,    We are writing to inform you of your test results.    Neisseria gonorrhea and chlamydia are both negative.      CBC normal.  Urinalysis has a few bacteria.  2 RBC, less than 1 WBC per high-power field.  PSA is normal.  Cholesterol levels are at goal.  Random glucose slightly elevated at 127.   creatinine 0.79 with GFR greater than 90.  Liver enzymes normal.  Potassium 4.3.      Syphilis and HIV screening negative.    Electronically signed by:  Yifan Oliva MD  on March 27, 2025 2:42 PM      Resulted Orders   First-voided urine-Chlamydia trachomatis/Neisseria gonorrhoeae by PCR   Result Value Ref Range    Chlamydia Trachomatis Negative Negative      Comment:      Negative for C. trachomatis rRNA by transcription mediated amplification.   A negative result by transcription mediated amplification does not preclude the presence of infection because results are dependent on proper and adequate collection, absence of inhibitors and sufficient rRNA to be detected.    Neisseria gonorrhoeae Negative Negative      Comment:      Negative for N. gonorrhoeae rRNA by transcription mediated amplification. A negative result by transcription mediated amplification does not preclude the presence of C. trachomatis infection because results are dependent on proper and adequate collection, absence of inhibitors and sufficient rRNA to be detected.    CTNG Specimen Source Urine, Voided     Narrative    Assay performed using lark real-time, reverse-transcriptase PCR.   Treponema Abs w Reflex to RPR and Titer   Result Value Ref Range    Treponema Antibody Total Nonreactive Nonreactive   HIV Antigen Antibody Combo Cascade   Result Value Ref Range    HIV Antigen Antibody Combo Nonreactive Nonreactive      Comment:      Negative HIV-1 p24 antigen and HIV-1/2 antibody screening test results usually indicate the absence of HIV-1 and  HIV-2 infection. However, such negative results do not rule-out acute HIV infection.  If acute HIV-1 or HIV-2 infection is suspected, detection of HIV-1 or HIV-2 RNA  is recommended. This result is obtained using the Roche Elecsys HIV Duo method on the alice e801 immunoassay analyzer.   Comprehensive metabolic panel   Result Value Ref Range    Sodium 136 135 - 145 mmol/L    Potassium 4.3 3.4 - 5.3 mmol/L    Carbon Dioxide (CO2) 25 22 - 29 mmol/L    Anion Gap 11 7 - 15 mmol/L    Urea Nitrogen 15.1 8.0 - 23.0 mg/dL    Creatinine 0.79 0.67 - 1.17 mg/dL    GFR Estimate >90 >60 mL/min/1.73m2      Comment:      eGFR calculated using 2021 CKD-EPI equation.    Calcium 9.3 8.8 - 10.4 mg/dL    Chloride 100 98 - 107 mmol/L    Glucose 127 (H) 70 - 99 mg/dL    Alkaline Phosphatase 61 40 - 150 U/L    AST 17 0 - 45 U/L    ALT 13 0 - 70 U/L    Protein Total 7.4 6.4 - 8.3 g/dL    Albumin 4.4 3.5 - 5.2 g/dL    Bilirubin Total 0.9 <=1.2 mg/dL    Patient Fasting > 8hrs? Yes    Lipid Profile   Result Value Ref Range    Cholesterol 165 <200 mg/dL    Triglycerides 102 <150 mg/dL    Direct Measure HDL 83 >=40 mg/dL    LDL Cholesterol Calculated 62 <100 mg/dL    Non HDL Cholesterol 82 <130 mg/dL    Patient Fasting > 8hrs? Yes     Narrative    Cholesterol  Desirable: < 200 mg/dL  Borderline High: 200 - 239 mg/dL  High: >= 240 mg/dL    Triglycerides  Normal: < 150 mg/dL  Borderline High: 150 - 199 mg/dL  High: 200-499 mg/dL  Very High: >= 500 mg/dL    Direct Measure HDL  Female: >= 50 mg/dL   Male: >= 40 mg/dL    LDL Cholesterol  Desirable: < 100 mg/dL  Above Desirable: 100 - 129 mg/dL   Borderline High: 130 - 159 mg/dL   High:  160 - 189 mg/dL   Very High: >= 190 mg/dL    Non HDL Cholesterol  Desirable: < 130 mg/dL  Above Desirable: 130 - 159 mg/dL  Borderline High: 160 - 189 mg/dL  High: 190 - 219 mg/dL  Very High: >= 220 mg/dL   Prostate Specific Antigen Screen   Result Value Ref Range    Prostate Specific Antigen Screen 1.51 0.00 - 6.50  ng/mL    Narrative    This result is obtained using the Roche Elecsys total PSA method on the alice e601 immunoassay analyzer, which is an ultrasensitive method. Results obtained with different assay methods or kits cannot be used interchangeably.  This test is intended for initial prostate cancer screening. PSA values exceeding the age-specific limits are suspicious for prostate disease, but additional testing, such as prostate biopsy, is needed to diagnose prostate pathology. The [employer] Society recommends annual examination with digital rectal examination and serum PSA beginning at age 50 and for men with a life expectancy of at least 10 years after detection of prostate cancer. For men in high-risk groups, such as  Americans or men with a first-degree relative diagnosed at a younger age, testing should begin at a younger age. It is generally recommended that information be provided to patients about the benefits and limitations of testing and treatment so they can make informed decisions.   UA with Microscopic reflex to Culture   Result Value Ref Range    Color Urine Yellow Colorless, Straw, Light Yellow, Yellow    Appearance Urine Clear Clear    Glucose Urine Negative Negative mg/dL    Bilirubin Urine Negative Negative    Ketones Urine Negative Negative mg/dL    Specific Gravity Urine 1.006 1.000 - 1.030    Blood Urine Negative Negative    pH Urine 7.0 5.0 - 9.0    Protein Albumin Urine Negative Negative mg/dL    Urobilinogen Urine Normal Normal mg/dL    Nitrite Urine Negative Negative    Leukocyte Esterase Urine Negative Negative    Bacteria Urine Few (A) None Seen /HPF    RBC Urine 2 <=2 /HPF    WBC Urine <1 <=5 /HPF    Narrative    Urine Culture not indicated   Hepatitis C Screen Reflex to HCV RNA Quant and Genotype   Result Value Ref Range    Hepatitis C Antibody Nonreactive Nonreactive      Comment:      A nonreactive screening test result does not exclude the possibility of exposure to or  infection with HCV. Nonreactive screening test results in individuals with prior exposure to HCV may be due to antibody levels below the limit of detection of this assay or lack of reactivity to the HCV antigens used in this assay. Patients with recent HCV infections (<3 months from time of exposure) may have false-negative HCV antibody results due to the time needed for seroconversion (average of 8 to 9 weeks).   CBC with platelets and differential   Result Value Ref Range    WBC Count 6.2 4.0 - 11.0 10e3/uL    RBC Count 5.00 4.40 - 5.90 10e6/uL    Hemoglobin 15.9 13.3 - 17.7 g/dL    Hematocrit 46.3 40.0 - 53.0 %    MCV 93 78 - 100 fL    MCH 31.8 26.5 - 33.0 pg    MCHC 34.3 31.5 - 36.5 g/dL    RDW 13.1 10.0 - 15.0 %    Platelet Count 180 150 - 450 10e3/uL    % Neutrophils 56 %    % Lymphocytes 27 %    % Monocytes 12 %    % Eosinophils 4 %    % Basophils 1 %    % Immature Granulocytes 0 %    NRBCs per 100 WBC 0 <1 /100    Absolute Neutrophils 3.5 1.6 - 8.3 10e3/uL    Absolute Lymphocytes 1.7 0.8 - 5.3 10e3/uL    Absolute Monocytes 0.7 0.0 - 1.3 10e3/uL    Absolute Eosinophils 0.2 0.0 - 0.7 10e3/uL    Absolute Basophils 0.1 0.0 - 0.2 10e3/uL    Absolute Immature Granulocytes 0.0 <=0.4 10e3/uL    Absolute NRBCs 0.0 10e3/uL       If you have any questions or concerns, please call the clinic at the number listed above.       Sincerely,      Yifan Oliva MD    Electronically signed

## 2025-03-26 NOTE — PROGRESS NOTES
Assessment & Plan     ICD-10-CM    1. Medicare annual wellness visit, subsequent  Z00.00       2. Vaccine counseling  Z71.85       3. Benign essential hypertension  I10 CBC with Platelets & Differential     Comprehensive metabolic panel     amLODIPine (NORVASC) 5 MG tablet     carvedilol (COREG) 3.125 MG tablet     rosuvastatin (CRESTOR) 10 MG tablet     carvedilol (COREG) 6.25 MG tablet     CBC with Platelets & Differential     Comprehensive metabolic panel      4. Hypercoagulable state due to longstanding persistent atrial fibrillation (H)  D68.69 warfarin ANTICOAGULANT (COUMADIN) 5 MG tablet    I48.11       5. Mixed hyperlipidemia  E78.2 Lipid Profile     Lipid Profile      6. Warfarin anticoagulation - for Atrial Fibrillation  Z79.01 warfarin ANTICOAGULANT (COUMADIN) 5 MG tablet      7. Encounter for screening for malignant neoplasm of prostate  Z12.5 Prostate Specific Antigen Screen     Prostate Specific Antigen Screen      8. Class 2 severe obesity due to excess calories with serious comorbidity and body mass index (BMI) of 36.0 to 36.9 in adult (H)  E66.812     E66.01     Z68.36       9. Pulmonary emphysema, unspecified emphysema type (H)  J43.9 CT Chest Lung Cancer Scrn Low Dose wo      10. Asbestos exposure - Early 1970s - fireproofing of buildings and (no respirator use at that time)  Z77.090 CT Chest Lung Cancer Scrn Low Dose wo      11. 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 History of tobacco abuse - Quit Feb 2020  Z87.891 CT Chest Lung Cancer Scrn Low Dose wo      13. Benign prostatic hyperplasia with urinary frequency  N40.1 UA with Microscopic reflex to Culture    R35.0 UA with Microscopic reflex to Culture      14. Personal history of tobacco use  Z87.891 CT Chest Lung Cancer Scrn Low Dose wo     Prof fee: Shared Decision Making for Lung Cancer Screening      15. Nocturia associated with benign prostatic hyperplasia  N40.1 Adult Urology  [facility] Referral    R35.1       16. Screen for STD (sexually transmitted disease)  Z11.3 First-voided urine-Chlamydia trachomatis/Neisseria gonorrhoeae by PCR     Treponema Abs w Reflex to RPR and Titer     HIV Antigen Antibody Combo Cascade     Hepatitis C Screen Reflex to HCV RNA Quant and Genotype     Hepatitis C Screen Reflex to HCV RNA Quant and Genotype         Patient presents for Medicare annual wellness visit as well as follow-up multiple issues.    Patient has unspecified emphysema.  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  Quit smoking in February 2020.  Patient qualifies for lung cancer screening CT.  See separate documentation below.    Warfarin oral anticoagulation.  Taking for atrial fibrillation.  Labs have been reasonably stable.  Needs updated refills of his medications.    Prostate lab cancer screening, check PSA levels.    BPH with urinary frequency, urgency.  Has been having frequent nocturia.  Check urinalysis.  Urology referral placed.    STD screening.  He would like multiple STD labs checked today including blood and urine.  Orders placed.  Distant history of STD exposure.    HYPERTENSION - Ongoing. Blood pressure is currently well controlled.  Medication side effects: None. Denies syncope or presyncope.  Continue current medications.   Medication list reviewed/updated. Refills completed as needed.      MIXED HYPERLIPIDEMIA.  Ongoing. LDL is at goal: Yes. Triglycerides are at goal: Yes.    Medication side effects reported: None.   Continue current medications for now. Medication list reviewed/updated. Refills completed as needed.  Recent Labs   Lab Test 03/26/25  1218 02/15/24  0845   CHOL 165 156   HDL 83 69   LDL 62 67   TRIG 102 99       Pulmonary Emphysema / COPD - Patient has a longstanding history of COPD: Mild = FeV1 > 80%. Patient has been doing reasonably well overall noting NO SYMPTOMS and continues on NO medication  regimen without adverse reactions or side effects.      OBESITY - Ongoing.  (See Encounter Diagnosis list above for obesity class / severity).  - Encourage continued maintenance / improvement in diet and exercise.   - Consider Nutrition / Dietician appointment.  - Weight loss would improve Hypertension, Cholesterol.      Atrial Fibrillation - Type: Longstanding and Persistent Atrial Fibrillation, chronic, ongoing.  + Hypercoagulable state due to atrial fibrillation.  Continues with warfarin (COUMADIN) for oral anticoagulation.  Heart rates are controlled with carvedilol.  Tolerating well.  Denies excessive bleeding issues.  Easy bruising. Medication list reviewed/updated. Refills completed as needed.     Chronic Kidney Disease, Stage 2 (GFR 60-89), chronic, ongoing.  Kidney function had been slowly declining.  Encourage NSAID avoidance.    Recent Labs   Lab Test 03/26/25  1218 02/15/24  0845   CR 0.79 0.98   GFRESTIMATED >90 82        Vaccine counseling completed.  Encourage routine / annual vaccinations.    The longitudinal plan of care for the diagnosis(es)/condition(s) as documented were addressed during this visit. Due to the added complexity in care, I will continue to support Juan C in the subsequent management and with ongoing continuity of care.      Results for orders placed or performed in visit on 03/26/25   Treponema Abs w Reflex to RPR and Titer     Status: Normal   Result Value Ref Range    Treponema Antibody Total Nonreactive Nonreactive   HIV Antigen Antibody Combo Cascade     Status: Normal   Result Value Ref Range    HIV Antigen Antibody Combo Nonreactive Nonreactive   Comprehensive metabolic panel     Status: Abnormal   Result Value Ref Range    Sodium 136 135 - 145 mmol/L    Potassium 4.3 3.4 - 5.3 mmol/L    Carbon Dioxide (CO2) 25 22 - 29 mmol/L    Anion Gap 11 7 - 15 mmol/L    Urea Nitrogen 15.1 8.0 - 23.0 mg/dL    Creatinine 0.79 0.67 - 1.17 mg/dL    GFR Estimate >90 >60 mL/min/1.73m2    Calcium  9.3 8.8 - 10.4 mg/dL    Chloride 100 98 - 107 mmol/L    Glucose 127 (H) 70 - 99 mg/dL    Alkaline Phosphatase 61 40 - 150 U/L    AST 17 0 - 45 U/L    ALT 13 0 - 70 U/L    Protein Total 7.4 6.4 - 8.3 g/dL    Albumin 4.4 3.5 - 5.2 g/dL    Bilirubin Total 0.9 <=1.2 mg/dL    Patient Fasting > 8hrs? Yes    Lipid Profile     Status: None   Result Value Ref Range    Cholesterol 165 <200 mg/dL    Triglycerides 102 <150 mg/dL    Direct Measure HDL 83 >=40 mg/dL    LDL Cholesterol Calculated 62 <100 mg/dL    Non HDL Cholesterol 82 <130 mg/dL    Patient Fasting > 8hrs? Yes     Narrative    Cholesterol  Desirable: < 200 mg/dL  Borderline High: 200 - 239 mg/dL  High: >= 240 mg/dL    Triglycerides  Normal: < 150 mg/dL  Borderline High: 150 - 199 mg/dL  High: 200-499 mg/dL  Very High: >= 500 mg/dL    Direct Measure HDL  Female: >= 50 mg/dL   Male: >= 40 mg/dL    LDL Cholesterol  Desirable: < 100 mg/dL  Above Desirable: 100 - 129 mg/dL   Borderline High: 130 - 159 mg/dL   High:  160 - 189 mg/dL   Very High: >= 190 mg/dL    Non HDL Cholesterol  Desirable: < 130 mg/dL  Above Desirable: 130 - 159 mg/dL  Borderline High: 160 - 189 mg/dL  High: 190 - 219 mg/dL  Very High: >= 220 mg/dL   Prostate Specific Antigen Screen     Status: Normal   Result Value Ref Range    Prostate Specific Antigen Screen 1.51 0.00 - 6.50 ng/mL    Narrative    This result is obtained using the Roche Elecsys total PSA method on the alice e601 immunoassay analyzer, which is an ultrasensitive method. Results obtained with different assay methods or kits cannot be used interchangeably.  This test is intended for initial prostate cancer screening. PSA values exceeding the age-specific limits are suspicious for prostate disease, but additional testing, such as prostate biopsy, is needed to diagnose prostate pathology. The American Cancer Society recommends annual examination with digital rectal examination and serum PSA beginning at age 50 and for men with a life  expectancy of at least 10 years after detection of prostate cancer. For men in high-risk groups, such as  Americans or men with a first-degree relative diagnosed at a younger age, testing should begin at a younger age. It is generally recommended that information be provided to patients about the benefits and limitations of testing and treatment so they can make informed decisions.   UA with Microscopic reflex to Culture     Status: Abnormal    Specimen: Urine, Midstream   Result Value Ref Range    Color Urine Yellow Colorless, Straw, Light Yellow, Yellow    Appearance Urine Clear Clear    Glucose Urine Negative Negative mg/dL    Bilirubin Urine Negative Negative    Ketones Urine Negative Negative mg/dL    Specific Gravity Urine 1.006 1.000 - 1.030    Blood Urine Negative Negative    pH Urine 7.0 5.0 - 9.0    Protein Albumin Urine Negative Negative mg/dL    Urobilinogen Urine Normal Normal mg/dL    Nitrite Urine Negative Negative    Leukocyte Esterase Urine Negative Negative    Bacteria Urine Few (A) None Seen /HPF    RBC Urine 2 <=2 /HPF    WBC Urine <1 <=5 /HPF    Narrative    Urine Culture not indicated   Hepatitis C Screen Reflex to HCV RNA Quant and Genotype     Status: Normal   Result Value Ref Range    Hepatitis C Antibody Nonreactive Nonreactive   CBC with platelets and differential     Status: None   Result Value Ref Range    WBC Count 6.2 4.0 - 11.0 10e3/uL    RBC Count 5.00 4.40 - 5.90 10e6/uL    Hemoglobin 15.9 13.3 - 17.7 g/dL    Hematocrit 46.3 40.0 - 53.0 %    MCV 93 78 - 100 fL    MCH 31.8 26.5 - 33.0 pg    MCHC 34.3 31.5 - 36.5 g/dL    RDW 13.1 10.0 - 15.0 %    Platelet Count 180 150 - 450 10e3/uL    % Neutrophils 56 %    % Lymphocytes 27 %    % Monocytes 12 %    % Eosinophils 4 %    % Basophils 1 %    % Immature Granulocytes 0 %    NRBCs per 100 WBC 0 <1 /100    Absolute Neutrophils 3.5 1.6 - 8.3 10e3/uL    Absolute Lymphocytes 1.7 0.8 - 5.3 10e3/uL    Absolute Monocytes 0.7 0.0 - 1.3  10e3/uL    Absolute Eosinophils 0.2 0.0 - 0.7 10e3/uL    Absolute Basophils 0.1 0.0 - 0.2 10e3/uL    Absolute Immature Granulocytes 0.0 <=0.4 10e3/uL    Absolute NRBCs 0.0 10e3/uL   First-voided urine-Chlamydia trachomatis/Neisseria gonorrhoeae by PCR     Status: None    Specimen: Urine, Voided   Result Value Ref Range    Chlamydia Trachomatis Negative Negative    Neisseria gonorrhoeae Negative Negative    CTNG Specimen Source Urine, Voided     Narrative    Assay performed using [employer] real-time, reverse-transcriptase PCR.   CBC with Platelets & Differential     Status: None    Narrative    The following orders were created for panel order CBC with Platelets & Differential.  Procedure                               Abnormality         Status                     ---------                               -----------         ------                     CBC with platelets and ...[[id number]]                      Final result                 Please view results for these tests on the individual orders.      Neisseria gonorrhea and chlamydia are both negative.  CBC normal.  Urinalysis has a few bacteria.  2 RBC, less than 1 WBC per high-power field.  PSA is normal.  Cholesterol levels are at goal.  Random glucose slightly elevated at 127.   creatinine 0.79 with GFR greater than 90.  Liver enzymes normal.  Potassium 4.3.  Syphilis and HIV screening negative.           BMI  Estimated body mass index is 36.89 kg/m  as calculated from the following:    Height as of this encounter: 1.829 m (6').    Weight as of this encounter: 123.4 kg (272 lb).     Counseling  Appropriate preventive services were addressed with this patient via screening, questionnaire, or discussion as appropriate for fall prevention, nutrition, physical activity, Tobacco-use cessation, social engagement, weight loss and cognition.  Checklist reviewing preventive services available has been given to the patient.  Reviewed patient's diet, addressing concerns  and/or questions.   He is at risk for lack of exercise and has been provided with information to increase physical activity for the benefit of his well-being.   The patient was instructed to see the dentist every 6 months.   Discussed possible causes of fatigue. The patient was provided with written information regarding signs of hearing loss.   Information on urinary incontinence and treatment options given to patient.         Return in about 1 year (around 3/26/2026) for Annual Medicare Wellness Visit.      Review of Systems   Constitutional:  Negative for chills and fever.   HENT:  Negative for congestion and hearing loss.    Eyes:  Negative for visual disturbance.   Respiratory:  Negative for cough, shortness of breath and wheezing.    Cardiovascular:  Negative for chest pain.   Gastrointestinal:  Negative for abdominal pain, diarrhea, nausea and vomiting.   Endocrine: Negative for cold intolerance and heat intolerance.   Genitourinary:  Negative for dysuria and hematuria.   Musculoskeletal:  Negative for arthralgias and myalgias.   Skin:  Negative for rash and wound.   Allergic/Immunologic: Negative for immunocompromised state.   Neurological:  Negative for dizziness and light-headedness.   Hematological:  Bruises/bleeds easily.   Psychiatric/Behavioral:  Negative for agitation and confusion.        Preventive Care Visit  [facility] AND [facility]  Yifan Oliva MD, Internal Medicine  Mar 26, 2025      Aspen Irizarry is a 74 year old, presenting for the following:  Medicare Visit        3/26/2025    11:03 AM   Additional Questions   Roomed by RAGINI Maurer   Accompanied by Self         3/26/2025    11:03 AM   Patient Reported Additional Medications   Patient reports taking the following new medications N/A           HPI           Advance Care Planning  Patient does not have a Health Care Directive: Discussed advance care planning with patient; information given to patient to review.      3/26/2025    General Health   How would you rate your overall physical health? (!) FAIR         3/26/2025   Nutrition   Diet: I don't know         3/26/2025   Exercise   Days per week of moderate/strenous exercise 2 days         3/26/2025   Social Factors   Frequency of gathering with friends or relatives Once a week         2/15/2024   Activities of Daily Living- Home Safety   Needs help with the following daily activites None of the above   Safety concerns in the home None of the above         3/26/2025   Dental   Dentist two times every year? (!) NO         2/15/2024   Hearing Screening   Hearing concerns? None of the above           2/15/2024   Urinary Incontinence Screening   Bothered by leaking urine in past 6 months No             Today's PHQ-2 Score:       3/26/2025    11:27 AM   PHQ-2 ( 1999 Pf[employer])   Q1: Little interest or pleasure in doing things 0    Q2: Feeling down, depressed or hopeless 0    PHQ-2 Score 0    Q1: Little interest or pleasure in doing things Not at all   Q2: Feeling down, depressed or hopeless Not at all   PHQ-2 Score 0       Proxy-reported         2/15/2024   Substance Use   Alcohol more than 3/day or more than 7/wk Yes   How often do you have a drink containing alcohol 2 to 3 times a week   How many alcohol drinks on typical day 3 or 4   How often do you have 5+ drinks at one occasion Less than monthly   Audit 2/3 Score 2   How often not able to stop drinking once started Never   How often failed to do what normally expected Never   How often needed first drink in am after a heavy drinking session Never   How often feeling of guilt or remorse after drinking Never   How often unable to remember what happened the night before Never   Have you or someone else been injured because of your drinking No   Has anyone been concerned or suggested you cut down on drinking No   TOTAL SCORE - AUDIT 5   Do you have a current opioid prescription? No   How severe/bad is pain from 1 to 10? 0/10 (No Pain)   Do you  use any other substances recreationally? (!) ALCOHOL     Social History     Tobacco Use     Smoking status: Former     Current packs/day: 0.00     Average packs/day: 1.5 packs/day for 50.0 years (75.0 ttl pk-yrs)     Types: Cigarettes     Start date: 1970     Quit date: 2020     Years since quittin.1     Smokeless tobacco: Former   Vaping Use     Vaping status: Never Used   Substance Use Topics     Alcohol use: Not Currently     Comment: 6 per day- Quit 4 years ago     Drug use: Not Currently       ASCVD Risk   The 10-year ASCVD risk score (Glenda NINO, et al., 2019) is: 21.6%    Values used to calculate the score:      Age: 74 years      Sex: Male      Is Non- : No      Diabetic: No      Tobacco smoker: No      Systolic Blood Pressure: 136 mmHg      Is BP treated: Yes      HDL Cholesterol: 83 mg/dL      Total Cholesterol: 165 mg/dL            Reviewed and updated as needed this visit by Provider   Tobacco  Allergies  Meds  Problems  Med Hx  Surg Hx  Fam Hx     Sexual Activity            Current providers sharing in care for this patient include:  Patient Care Team:  Yifan Oliva MD as PCP - General (Internal Medicine)  Kayode Mcgowan MD as Assigned PCP    The following health maintenance items are reviewed in Epic and correct as of today:  Health Maintenance   Topic Date Due     SPIROMETRY  Never done     COPD ACTION PLAN  Never done     RSV VACCINE (1 - Risk 60-74 years 1-dose series) Never done     LUNG CANCER SCREENING  2024     INFLUENZA VACCINE (1) 2024     COVID-19 Vaccine ( season) 2024     MEDICARE ANNUAL WELLNESS VISIT  2026     BMP  2026     LIPID  2026     FALL RISK ASSESSMENT  2026     DIABETES SCREENING  2028     DTAP/TDAP/TD IMMUNIZATION (3 - Td or Tdap) 2029     ADVANCE CARE PLANNING  2030     PHQ-2 (once per calendar year)  Completed     Pneumococcal Vaccine: 50+ Years   Completed     ZOSTER IMMUNIZATION  Completed     AORTIC ANEURYSM SCREENING (SYSTEM ASSIGNED)  Completed     HPV IMMUNIZATION  Aged Out     MENINGITIS IMMUNIZATION  Aged Out     HEPATITIS C SCREENING  Discontinued     COLORECTAL CANCER SCREENING  Discontinued            Objective    Exam  /84   Pulse 67   Temp 96.9  F (36.1  C) (Temporal)   Resp 16   Ht 1.829 m (6')   Wt 123.4 kg (272 lb)   SpO2 97%   BMI 36.89 kg/m     Estimated body mass index is 36.89 kg/m  as calculated from the following:    Height as of this encounter: 1.829 m (6').    Weight as of this encounter: 123.4 kg (272 lb).    Physical Exam  Constitutional:       General: He is not in acute distress.     Appearance: Normal appearance. He is well-developed. He is obese. He is not ill-appearing.   Eyes:      General: No scleral icterus.     Conjunctiva/sclera: Conjunctivae normal.   Neck:      Vascular: No carotid bruit.   Cardiovascular:      Rate and Rhythm: Normal rate. Rhythm irregularly irregular.      Pulses: Normal pulses.   Pulmonary:      Effort: Pulmonary effort is normal. No respiratory distress.      Breath sounds: Wheezing (couple scattered) present.      Comments: Mildly prolonged expiratory phase  Abdominal:      Palpations: Abdomen is soft.      Tenderness: There is no abdominal tenderness.   Musculoskeletal:         General: No tenderness or deformity.      Right lower leg: No edema.      Left lower leg: No edema.   Skin:     General: Skin is warm and dry.      Findings: Bruising present. No rash.   Neurological:      Mental Status: He is alert. Mental status is at baseline.      Cranial Nerves: No cranial nerve deficit.   Psychiatric:         Mood and Affect: Mood normal.         Behavior: Behavior normal.             3/26/2025   Mini Cog   Clock Draw Score 2 Normal   3 Item Recall 3 objects recalled   Mini Cog Total Score 5              Signed Electronically by: Yifan Oliva MD  Lung Cancer Screening Shared Decision  Making Visit     Juan C Troncoso, a 74 year old male, is eligible for lung cancer screening    History   Smoking Status     Former     Types: Cigarettes   Smokeless Tobacco     Former       I have discussed with patient the risks and benefits of screening for lung cancer with low-dose CT.     The risks include:    radiation exposure: one low dose chest CT has as much ionizing radiation as about 15 chest x-rays, or 6 months of background radiation living in [place]      false positives: most findings/nodules are NOT cancer, but some might still require additional diagnostic evaluation, including biopsy    over-diagnosis: some slow growing cancers that might never have been clinically significant will be detected and treated unnecessarily     The benefit of early detection of lung cancer is contingent upon adherence to annual screening or more frequent follow up if indicated.     Furthermore, to benefit from screening, Juan C must be willing and able to undergo diagnostic procedures, if indicated. Although no specific guide is available for determining severity of comorbidities, it is reasonable to withhold screening in patients who have greater mortality risk from other diseases.     We did discuss that the best way to prevent lung cancer is to not smoke.    Some patients may value a numeric estimation of lung cancer risk when evaluating if lung cancer screening is right for them, here is one calculator:    ShouldIScreen

## 2025-03-26 NOTE — NURSING NOTE
Chief Complaint   Patient presents with    Medicare Visit       Initial /84   Pulse 67   Temp 96.9  F (36.1  C) (Temporal)   Resp 16   Ht 1.829 m (6')   Wt 123.4 kg (272 lb)   SpO2 97%   BMI 36.89 kg/m   Estimated body mass index is 36.89 kg/m  as calculated from the following:    Height as of this encounter: 1.829 m (6').    Weight as of this encounter: 123.4 kg (272 lb).  Medication Reconciliation: complete

## 2025-03-26 NOTE — PATIENT INSTRUCTIONS
Blood pressure is well controlled.       --> Insurance wants separate Rx for the Coreg / Carvedilol.......         Medications refilled.   Labs are pending.       CT scan of lungs ordered  - they will call with date/time of appointment.        Urology consult requested  - they will call with date/time of appointment.        Return in approximately 1 year, or sooner as needed for follow-up with Dr. Oliva.  - Annual Follow-up / Physical - Medicare Annual Wellness Visit     Clinic : [phone]  Appointment line: [phone]           Lung Cancer Screening   Frequently Asked Questions  If you are at high-risk for lung cancer, getting screened with low-dose computed tomography (LDCT) every year can help save your life. This handout offers answers to some of the most common questions about lung cancer screening. If you have other questions, please call [phone] ([phone]).     What is it?  Lung cancer screening uses special X-ray technology to create an image of your lung tissue. The exam is quick and easy and takes less than 10 seconds. We don t give you any medicine or use any needles. You can eat before and after the exam. You don t need to change your clothes as long as the clothing on your chest doesn t contain metal. But, you do need to be able to hold your breath for at least 6 seconds during the exam.    What is the goal of lung cancer screening?  The goal of lung cancer screening is to save lives. Many times, lung cancer is not found until a person starts having physical symptoms. Lung cancer screening can help detect lung cancer in the earliest stages when it may be easier to treat.    Who should be screened for lung cancer?  We suggest lung cancer screening for anyone who is at high-risk for lung cancer. You are in the high-risk group if you:     are between the ages of 55 and 79, and   have smoked at least 1 pack of cigarettes a day for 20 or more years, and   still smoke or have  quit within the past 15 years.    However, if you have a new cough or shortness of breath, you should talk to your doctor before being screened.    Why does it matter if I have symptoms?  Certain symptoms can be a sign that you have a condition in your lungs that should be checked and treated by your doctor. These symptoms include fever, chest pain, a new or changing cough, shortness of breath that you have never felt before, coughing up blood or unexplained weight loss. Having any of these symptoms can greatly affect the results of lung cancer screening.       Should all smokers get an LDCT lung cancer screening exam?  It depends. Lung cancer screening is for a very specific group of men and women who have a history of heavy smoking over a long period of time (see  Who should be screened for lung cancer  above).  I am in the high-risk group, but have been diagnosed with cancer in the past. Is LDCT lung cancer screening right for me?  In some cases, you should not have LDCT lung screening, such as when your doctor is already following your cancer with CT scan studies. Your doctor will help you decide if LDCT lung screening is right for you.  Do I need to have a screening exam every year?  Yes. If you are in the high-risk group described earlier, you should get an LDCT lung cancer screening exam every year until you are 79, or are no longer willing or able to undergo screening and possible procedures to diagnose and treat lung cancer.  How effective is LDCT at preventing death from lung cancer?  Studies have shown that LDCT lung cancer screening can lower the risk of death from lung cancer by 20 percent in people who are at high-risk.  What are the risks?  There are some risks and limitations of LDCT lung cancer screening. We want to make sure you understand the risks and benefits, so please let us know if you have any questions. Your doctor may want to talk with you more about these risks.   Radiation exposure: As  with any exam that uses radiation, there is a very small increased risk of cancer. The amount of radiation in LDCT is small--about the same amount a person would get from a mammogram. Your doctor orders the exam when he or she feels the potential benefits outweigh the risks.   False negatives: No test is perfect, including LDCT. It is possible that you may have a medical condition, including lung cancer, that is not found during your exam. This is called a false negative result.   False positives and more testing: LDCT very often finds something in the lung that could be cancer, but in fact is not. This is called a false positive result. False positive tests often cause anxiety. To make sure these findings are not cancer, you may need to have more tests. These tests will be done only if you give us permission. Sometimes patients need a treatment that can have side effects, such as a biopsy. For more information on false positives, see  What can I expect from the results?    Findings not related to lung cancer: Your LDCT exam also takes pictures of areas of your body next to your lungs. In a very small number of cases, the CT scan will show an abnormal finding in one of these areas, such as your kidneys, adrenal glands, liver or thyroid. This finding may not be serious, but you may need more tests. Your doctor can help you decide what other tests you may need, if any.  What can I expect from the results?  About 1 out of 4 LDCT exams will find something that may need more tests. Most of the time, these findings are lung nodules. Lung nodules are very small collections of tissue in the lung. These nodules are very common, and the vast majority--more than 97 percent--are not cancer (benign). Most are normal lymph nodes or small areas of scarring from past infections.  But, if a small lung nodule is found to be cancer, the cancer can be cured more than 90 percent of the time. To know if the nodule is cancer, we may need  to get more images before your next yearly screening exam. If the nodule has suspicious features (for example, it is large, has an odd shape or grows over time), we will refer you to a specialist for further testing.  Will my doctor also get the results?  Yes. Your doctor will get a copy of your results.  Is it okay to keep smoking now that there s a cancer screening exam?  No. Tobacco is one of the strongest cancer-causing agents. It causes not only lung cancer, but other cancers and cardiovascular (heart) diseases as well. The damage caused by smoking builds over time. This means that the longer you smoke, the higher your risk of disease. While it is never too late to quit, the sooner you quit, the better.  Where can I find help to quit smoking?  The best way to prevent lung cancer is to stop smoking. If you have already quit smoking, congratulations and keep it up! For help on quitting smoking, please call [employer] at 6-900-QUITNOW (1-943.467.7997) or the [employer] at 1-780.847.3455 to find local resources near you.  One-on-one health coaching:  If you d prefer to work individually with a health care provider on tobacco cessation, we offer:     Medication Therapy Management:  Our specially trained pharmacists work closely with you and your doctor to help you quit smoking.  Call [phone] or [phone] (toll free).

## 2025-03-27 DIAGNOSIS — R32 URINARY INCONTINENCE: Primary | ICD-10-CM

## 2025-03-27 LAB — T PALLIDUM AB SER QL: NONREACTIVE

## 2025-03-28 ENCOUNTER — ANCILLARY ORDERS (OUTPATIENT)
Dept: RADIOLOGY | Facility: CLINIC | Age: 75
End: 2025-03-28
Payer: COMMERCIAL

## 2025-04-01 ENCOUNTER — LAB (OUTPATIENT)
Dept: LAB | Facility: OTHER | Age: 75
End: 2025-04-01
Payer: COMMERCIAL

## 2025-04-01 ENCOUNTER — OFFICE VISIT (OUTPATIENT)
Dept: UROLOGY | Facility: OTHER | Age: 75
End: 2025-04-01
Attending: INTERNAL MEDICINE
Payer: COMMERCIAL

## 2025-04-01 VITALS
TEMPERATURE: 97.7 F | OXYGEN SATURATION: 96 % | BODY MASS INDEX: 36.84 KG/M2 | HEIGHT: 72 IN | SYSTOLIC BLOOD PRESSURE: 126 MMHG | DIASTOLIC BLOOD PRESSURE: 74 MMHG | HEART RATE: 85 BPM | WEIGHT: 272 LBS | RESPIRATION RATE: 22 BRPM

## 2025-04-01 DIAGNOSIS — R32 URINARY INCONTINENCE: ICD-10-CM

## 2025-04-01 DIAGNOSIS — R35.1 NOCTURIA ASSOCIATED WITH BENIGN PROSTATIC HYPERPLASIA: ICD-10-CM

## 2025-04-01 DIAGNOSIS — N40.1 NOCTURIA ASSOCIATED WITH BENIGN PROSTATIC HYPERPLASIA: ICD-10-CM

## 2025-04-01 LAB
ALBUMIN UR-MCNC: NEGATIVE MG/DL
APPEARANCE UR: CLEAR
BILIRUB UR QL STRIP: NEGATIVE
COLOR UR AUTO: NORMAL
GLUCOSE UR STRIP-MCNC: NEGATIVE MG/DL
HGB UR QL STRIP: NEGATIVE
KETONES UR STRIP-MCNC: NEGATIVE MG/DL
LEUKOCYTE ESTERASE UR QL STRIP: NEGATIVE
NITRATE UR QL: NEGATIVE
PH UR STRIP: 7.5 [PH] (ref 5–9)
SP GR UR STRIP: 1.01 (ref 1–1.03)
UROBILINOGEN UR STRIP-MCNC: NORMAL MG/DL

## 2025-04-01 PROCEDURE — 51798 US URINE CAPACITY MEASURE: CPT

## 2025-04-01 PROCEDURE — 81003 URINALYSIS AUTO W/O SCOPE: CPT | Mod: ZL

## 2025-04-01 PROCEDURE — G0463 HOSPITAL OUTPT CLINIC VISIT: HCPCS | Mod: 25

## 2025-04-01 RX ORDER — TAMSULOSIN HYDROCHLORIDE 0.4 MG/1
0.4 CAPSULE ORAL DAILY
Qty: 90 CAPSULE | Refills: 3 | Status: SHIPPED | OUTPATIENT
Start: 2025-04-01 | End: 2025-04-01

## 2025-04-01 RX ORDER — TAMSULOSIN HYDROCHLORIDE 0.4 MG/1
0.4 CAPSULE ORAL DAILY
Qty: 90 CAPSULE | Refills: 3 | Status: SHIPPED | OUTPATIENT
Start: 2025-04-01

## 2025-04-01 NOTE — PATIENT INSTRUCTIONS
Drink plenty of fluids, > 2 liters/day  Avoid constipation.  Start Miralax 17 gm one cap daily.  Limit bladder irritants including alcohol, caffeine, and heavily seasoned foods.  Consider 15-20 minutes of exercise daily. Re  Reduce sodium intake to 2000 mg daily (this is primarily in food in the middle of the grocery store.)  Consider double-voiding with the second void in the tripod position.   Start tamsulosin 0.4mg daily at bedtime. Watch for dizziness with position changes.  Stop caffeine 12 hours before bedtime.  Sips of water after dinner.   Elevate legs through out the days if you notice swelling.  Follow up in 12 weeks.

## 2025-04-01 NOTE — NURSING NOTE
Chief Complaint   Patient presents with    Benign Prostatic Hypertrophy    Nocturia    Incontinence     consult       Initial /74   Pulse 85   Temp 97.7  F (36.5  C) (Tympanic)   Resp 22   Ht 1.829 m (6')   Wt 123.4 kg (272 lb)   SpO2 96%   BMI 36.89 kg/m   Estimated body mass index is 36.89 kg/m  as calculated from the following:    Height as of this encounter: 1.829 m (6').    Weight as of this encounter: 123.4 kg (272 lb).  Medication Reconciliation: complete      AUA- 35  post void residual -144 ml    Mary Sage LPN     PROVIDER:[TOKEN:[[id number]]]

## 2025-04-01 NOTE — PROGRESS NOTES
Chief Complaint: Benign Prostatic Hypertrophy, Nocturia, and Incontinence (consult)  .    HPI: Mr. Juan C Troncoso is a 75 year old year old male with a history of pulmonary emphysema, hyperlipidemia, hypertension, and A-fib, who presents today April 1, 2025 for evaluation of nocturia secondary to BPH.    Per review of PCP note patient has urinary frequency and urgency with frequent nocturia.  His last PSA was 1.51.  Does not appear patient has been previously seen by urology.    Patient reports weak stream, intermittent  flow. Patient voids every 1-2 hours during the day and is getting up 8 during the night. Denies stress leakage. Endorses urinary urgency, urinary frequency, and dribbling.Reports he leaks daily and wears depends on occasion.  These symptoms started 10+ years ago. Alcohol/caffeine makes symptoms worse, refraining from these things helps. Patient reports that he does not snore, but has a history being a deep sleeper and wet bed until age 15.    Patient drinks 16 ounces of water per day. Patient consumes 6-7 servings of caffeine in the form of  coffee per day.With the last beverage at noon.  Drinks 4-5 alcohol-containing beverages per week. Does/does not typically consume spicy foods.         Past Medical History:   Diagnosis Date    Alcoholic cardiomyopathy (H)     Cardioverted.  Subsequently started on Amiodarone, Coumadin and Lisinopril.  Due for repeat echocardiography and cardiology follow-up 9/05.    Atrial fibrillation (H)     Atrial fibrillation possibly related to alcoholic cardiomyopathy.  Had a consultation with Dr. Henriquez on 02/28/05 and was advised to totally abstain from alcohol consumption.       Past Surgical History:   Procedure Laterality Date    ECHOCARDIOGRAM INTRAOPERATIVE IN OR      revealed minimal left ventricular hypertrophy without valvular disease.    OTHER SURGICAL HISTORY      208850,CHEST TUBE INSERTION,Previous chest tube placement    TONSILLECTOMY      No Comments  Provided       FAMILY HISTORY: Denies a family history of prostate or kidney cancer, endorses a family history of bladder cancer.      SOCIAL HISTORY:    reports that he quit smoking about 5 years ago. His smoking use included cigarettes. He started smoking about 55 years ago. He has a 75 pack-year smoking history. He has quit using smokeless tobacco.    Current Outpatient Medications   Medication Sig Dispense Refill    amLODIPine (NORVASC) 5 MG tablet Take 1 tablet (5 mg) by mouth daily. 90 tablet 4    carvedilol (COREG) 3.125 MG tablet Take 2 tablets (6.25 mg) by mouth every evening. + separate 6.25 mg tablet in morning 90 tablet 4    rosuvastatin (CRESTOR) 10 MG tablet Take 1 tablet (10 mg) by mouth daily. 90 tablet 4    warfarin ANTICOAGULANT (COUMADIN) 5 MG tablet Take daily as directed 200 tablet 4    carvedilol (COREG) 6.25 MG tablet Take 1 tablet (6.25 mg) by mouth every morning. + separate 3.125 mg tablet in evening (Patient not taking: Reported on 4/1/2025) 90 tablet 4       ALLERGIES: Patient has no known allergies.     GENERAL PHYSICAL EXAM:   Vitals: /74   Pulse 85   Temp 97.7  F (36.5  C) (Tympanic)   Resp 22   Ht 1.829 m (6')   Wt 123.4 kg (272 lb)   SpO2 96%   BMI 36.89 kg/m    Body mass index is 36.89 kg/m .    GENERAL: Well groomed, well developed, well nourished male in NAD.  HEENT:  Normal   CV:  Warm extremities   RESPIRATORY: Normal respiratory effort.    GI: Soft, NT, ND,  MS: Moving all four  NEURO: Alert and oriented x 3.  PSYCH: Normal mood and affect, pleasant and agreeable during interview and exam.    :  Prostate: Approximately 70 ml, bulbous, nontender, symmetrical, no nodules appreciated.    PVR: Residual urine by ultrasound was 144 ml.      AUA score is 35.    RADIOLOGY: The following tests were reviewed: None available.    LABS: The last test results for Ms. Juan C Troncoso were reviewed.   Results for orders placed or performed in visit on 04/01/25 (from the past 24  hours)   Urinalysis Macroscopic   Result Value Ref Range    Color Urine Light Yellow Colorless, Straw, Light Yellow, Yellow    Appearance Urine Clear Clear    Glucose Urine Negative Negative mg/dL    Bilirubin Urine Negative Negative    Ketones Urine Negative Negative mg/dL    Specific Gravity Urine 1.010 1.000 - 1.030    Blood Urine Negative Negative    pH Urine 7.5 5.0 - 9.0    Protein Albumin Urine Negative Negative mg/dL    Urobilinogen Urine Normal Normal mg/dL    Nitrite Urine Negative Negative    Leukocyte Esterase Urine Negative Negative       PSA -   Lab Results   Component Value Date    PSA 1.51 03/26/2025    PSA 1.41 02/15/2024     BMP -   Recent Labs   Lab Test 03/26/25  1218 02/15/24  0845 05/07/19  0452 05/06/19  0720    137 139  --    POTASSIUM 4.3 4.4 3.9  --    CHLORIDE 100 103 110*  --    CO2 25 26 26  --    BUN 15.1 19.2 11  --    CR 0.79 0.98 0.68*  --    * 118* 125*  --    NOMAN 9.3 9.6 8.1*  --    MAG  --   --   --  1.6*       CBC -   Recent Labs   Lab Test 03/26/25  1218 02/15/24  0845 05/07/19  0452   WBC 6.2 6.7 6.6   HGB 15.9 15.0 8.1*    181 138*       ASSESSMENT:   Nocturia in the presence of an enlarged prostate, frequent alcohol use, and excessive caffeine use.  Additionally patient has had significant abdominal weight gain per his report which may be contributing to his urinary urgency.  Denies ANNETTE symptoms.  Reports intermittently has some lower extremity swelling.    PLAN:   1. Nocturia associated with benign prostatic hyperplasia  Discussion with patient about Benign Prostatic Hypertophy and enlargement of the male prostate with aging.  Explained the incidence of BPH which occurs in 40% of 40-year olds, 50% of 50-year olds, 60% of 60-year olds, etc.    Explained the importance of PSA testing and digital examination of the prostate (CHRISTINE).      Explained to patient that other conditions may mimic BPH including excessive caffeine consumption, alcohol consumption,  constipation, spicy and acidic foods, stress, diabetes, obesity, and neurologic reasons such as Multiple Sclerosis and Parkinson's Disease.     Recommended behavioral therapy with reduction/elimination of caffeine and alcohol, certain foods/spices, avoidance of constipation and all cold and pain medications.      Discussed the use of alpha blockers such as tamsulosin. Side effects of alpha blockers discussed including retrograde ejaculation, ED, dizziness upon standing, fatigue and nasal congestion.    Discussed finasteride and dutasteride which work by reducing the size of the prostate over 6 to 12 months.  Side effects including diminished libido(centrally), erectile dysfunction, breast tenderness and a < 0.5% risk of developing high grade prostate cancer.     Finally discussed surgical therapies such as TURP, TUIP, Holep, Urolift, Rezum and Robotic assisted Simple Prostatectomy and their associated risks and benefits.      Patient voiced an understanding.  Patient will attempt to reduce bladder irritants including daily alcohol use and excessive caffeine intake.  He will work to increase his fluid volume to 2 L of water daily.  He will try to implement 15 to 20 minutes of exercise per day and reduce caloric intake.  We discussed that alcohol can be a significant contributor to excess calorie intake.  I encourage patient to elevate feet intermittently throughout the day should he notice swelling and/or utilize compression stockings.  I encouraged him to have his last serving of caffeine around 9 AM and alcohol very sparingly.  I recommended he reduce sodium intake to 2000 mg daily.  I encouraged him to resume tamsulosin 0.4 mg at night and to watch for dizziness with standing.  He should follow-up with me in 3 months to assess level of improvement.  We may consider finasteride and/or cystoscopy to assess for outlet obstruction at that time.    - Adult Urology  Referral  - MEASUREMENT, POST-VOIDING  RESIDUAL URINE &/OR BLADDER CAPACITY, US, NON-IMAGING  - tamsulosin (FLOMAX) 0.4 MG capsule; Take 1 capsule (0.4 mg) by mouth daily.  Dispense: 90 capsule; Refill: 3      32 minutes spent on the date of this encounter doing chart review, history and exam, documentation and further activities as noted above.      AYAN Davies [facility]

## 2025-04-08 DIAGNOSIS — N40.1 NOCTURIA ASSOCIATED WITH BENIGN PROSTATIC HYPERPLASIA: Primary | ICD-10-CM

## 2025-04-08 DIAGNOSIS — R35.1 NOCTURIA ASSOCIATED WITH BENIGN PROSTATIC HYPERPLASIA: Primary | ICD-10-CM

## 2025-04-08 RX ORDER — TAMSULOSIN HYDROCHLORIDE 0.4 MG/1
0.4 CAPSULE ORAL DAILY
Qty: 90 CAPSULE | Refills: 3 | Status: SHIPPED | OUTPATIENT
Start: 2025-04-08

## 2025-04-08 RX ORDER — TAMSULOSIN HYDROCHLORIDE 0.4 MG/1
0.4 CAPSULE ORAL DAILY
Qty: 90 CAPSULE | Refills: 3 | Status: SHIPPED | OUTPATIENT
Start: 2025-04-08 | End: 2025-04-08

## 2025-05-01 ENCOUNTER — ANTICOAGULATION THERAPY VISIT (OUTPATIENT)
Dept: ANTICOAGULATION | Facility: OTHER | Age: 75
End: 2025-05-01
Attending: FAMILY MEDICINE
Payer: COMMERCIAL

## 2025-05-01 LAB — INR POINT OF CARE: 2.6 (ref 0.9–1.1)

## 2025-05-01 PROCEDURE — 85610 PROTHROMBIN TIME: CPT | Mod: ZL,QW

## 2025-05-01 NOTE — PROGRESS NOTES
ANTICOAGULATION MANAGEMENT     Juan C Troncoso 75 year old male is on warfarin with therapeutic INR result. (Goal INR 2.0-3.0)    Recent labs: (last 7 days)     05/01/25  0829   INR 2.6*       ASSESSMENT     Source(s): Chart Review and In person      Warfarin doses taken: Warfarin taken as instructed  Diet: No new diet changes identified  New illness, injury, or hospitalization: No  Medication/supplement changes: None noted  Signs or symptoms of bleeding or clotting: No  Previous INR: Therapeutic last 2(+) visits  Additional findings: None       PLAN     Recommended plan for no diet, medication or health factor changes affecting INR     Dosing Instructions: Continue your current warfarin dose with next INR in 5 weeks   due to going to [place] on 6/11/25-6/20/25    Summary  As of 5/1/2025      Full warfarin instructions:  5 mg every Wed; 7.5 mg all other days   Next INR check:  6/5/2025               In person    Lab visit scheduled    Education provided: Please call back if any changes to your diet, medications or how you've been taking warfarin    Plan made per [facility] anticoagulation protocol    Amanda Bangura RN  Anticoagulation Clinic  5/1/2025    _______________________________________________________________________     Anticoagulation Episode Summary       Current INR goal:  2.0-3.0   TTR:  81.9% (1 y)   Target end date:  Indefinite   Send INR reminders to:   ANTICOAGULATION NURSE       Comments:  --             Anticoagulation Care Providers       Provider Role Specialty Phone number    Kayode Mcgowan MD Referring Family Medicine [phone]

## 2025-05-15 ENCOUNTER — TELEPHONE (OUTPATIENT)
Dept: INTERNAL MEDICINE | Facility: OTHER | Age: 75
End: 2025-05-15
Payer: COMMERCIAL

## 2025-05-15 DIAGNOSIS — R35.1 NOCTURIA ASSOCIATED WITH BENIGN PROSTATIC HYPERPLASIA: ICD-10-CM

## 2025-05-15 DIAGNOSIS — N40.1 NOCTURIA ASSOCIATED WITH BENIGN PROSTATIC HYPERPLASIA: ICD-10-CM

## 2025-05-15 DIAGNOSIS — I10 BENIGN ESSENTIAL HYPERTENSION: ICD-10-CM

## 2025-05-15 RX ORDER — CARVEDILOL 3.12 MG/1
3.12 TABLET ORAL EVERY EVENING
Qty: 90 TABLET | Refills: 4 | Status: SHIPPED | OUTPATIENT
Start: 2025-05-15

## 2025-05-15 RX ORDER — CARVEDILOL 6.25 MG/1
6.25 TABLET ORAL EVERY MORNING
Qty: 90 TABLET | Refills: 4 | Status: SHIPPED | OUTPATIENT
Start: 2025-05-15

## 2025-05-15 NOTE — TELEPHONE ENCOUNTER
Smiley from [facility]  has questions regarding patient's carvedilol medication.  Need dosing clarified.    Columba Cantor on 5/15/2025 at 10:00 AM

## 2025-05-15 NOTE — TELEPHONE ENCOUNTER
Pharmacy inquiring about clarification for morning and evening dosing.  Please advise.    Briana Ornelas LPN on 5/15/2025 at 11:57 AM

## 2025-06-05 ENCOUNTER — ANTICOAGULATION THERAPY VISIT (OUTPATIENT)
Dept: ANTICOAGULATION | Facility: OTHER | Age: 75
End: 2025-06-05
Attending: INTERNAL MEDICINE
Payer: COMMERCIAL

## 2025-06-05 LAB — INR POINT OF CARE: 1.7 (ref 0.9–1.1)

## 2025-06-05 PROCEDURE — 36416 COLLJ CAPILLARY BLOOD SPEC: CPT | Mod: ZL

## 2025-06-05 NOTE — PROGRESS NOTES
ANTICOAGULATION MANAGEMENT     Juan C Troncoso 75 year old male is on warfarin with subtherapeutic INR result. (Goal INR 2.0-3.0)    Recent labs: (last 7 days)     06/05/25  0831   INR 1.7*       ASSESSMENT     Source(s): Chart Review and In person     Warfarin doses taken: Warfarin taken as instructed  Diet: No new diet changes identified  New illness, injury, or hospitalization: No  Medication/supplement changes: None noted  Signs or symptoms of bleeding or clotting: No  Previous INR: Therapeutic last 2(+) visits  Additional findings: None       PLAN     Recommended plan for no diet, medication or health factor changes affecting INR     Dosing Instructions: Continue your current warfarin dose with next INR in 3 weeks     Patient will be out of town on vacation for the next 2 weeks. He has elected to retest in 3 weeks. Patient education provided.     Summary  As of 6/5/2025      Full warfarin instructions:  5 mg every Wed; 7.5 mg all other days   Next INR check:  6/26/2025               Instructions given in person, patient verbalizes understanding.      Lab visit scheduled    Education provided: Please call back if any changes to your diet, medications or how you've been taking warfarin    Plan made per [facility] anticoagulation protocol    Sarai Montano, RN  Anticoagulation Clinic  6/5/2025    _______________________________________________________________________     Anticoagulation Episode Summary       Current INR goal:  2.0-3.0   TTR:  78.8% (1 y)   Target end date:  Indefinite   Send INR reminders to:   ANTICOAGULATION NURSE       Comments:  --             Anticoagulation Care Providers       Provider Role Specialty Phone number    Kayode Mcgowan MD Referring Family Medicine [phone]

## 2025-06-24 ENCOUNTER — LAB (OUTPATIENT)
Dept: LAB | Facility: OTHER | Age: 75
End: 2025-06-24
Payer: COMMERCIAL

## 2025-06-24 ENCOUNTER — RESULTS FOLLOW-UP (OUTPATIENT)
Dept: UROLOGY | Facility: OTHER | Age: 75
End: 2025-06-24

## 2025-06-24 ENCOUNTER — OFFICE VISIT (OUTPATIENT)
Dept: UROLOGY | Facility: OTHER | Age: 75
End: 2025-06-24
Payer: COMMERCIAL

## 2025-06-24 VITALS
SYSTOLIC BLOOD PRESSURE: 128 MMHG | BODY MASS INDEX: 36.59 KG/M2 | TEMPERATURE: 97.9 F | HEART RATE: 80 BPM | WEIGHT: 269.8 LBS | RESPIRATION RATE: 16 BRPM | DIASTOLIC BLOOD PRESSURE: 80 MMHG | OXYGEN SATURATION: 94 %

## 2025-06-24 DIAGNOSIS — R32 URINARY INCONTINENCE: ICD-10-CM

## 2025-06-24 DIAGNOSIS — N40.1 NOCTURIA ASSOCIATED WITH BENIGN PROSTATIC HYPERPLASIA: ICD-10-CM

## 2025-06-24 DIAGNOSIS — R35.1 NOCTURIA ASSOCIATED WITH BENIGN PROSTATIC HYPERPLASIA: ICD-10-CM

## 2025-06-24 DIAGNOSIS — R32 URINARY INCONTINENCE, UNSPECIFIED TYPE: Primary | ICD-10-CM

## 2025-06-24 LAB
ALBUMIN UR-MCNC: NEGATIVE MG/DL
APPEARANCE UR: CLEAR
BILIRUB UR QL STRIP: NEGATIVE
COLOR UR AUTO: YELLOW
GLUCOSE UR STRIP-MCNC: NEGATIVE MG/DL
HGB UR QL STRIP: NEGATIVE
KETONES UR STRIP-MCNC: NEGATIVE MG/DL
LEUKOCYTE ESTERASE UR QL STRIP: NEGATIVE
NITRATE UR QL: NEGATIVE
PH UR STRIP: 7.5 [PH] (ref 5–9)
SP GR UR STRIP: 1.01 (ref 1–1.03)
UROBILINOGEN UR STRIP-MCNC: NORMAL MG/DL

## 2025-06-24 PROCEDURE — 51798 US URINE CAPACITY MEASURE: CPT

## 2025-06-24 PROCEDURE — G0463 HOSPITAL OUTPT CLINIC VISIT: HCPCS

## 2025-06-24 PROCEDURE — 81003 URINALYSIS AUTO W/O SCOPE: CPT | Mod: ZL

## 2025-06-24 RX ORDER — SILODOSIN 4 MG/1
4 CAPSULE ORAL DAILY
Qty: 30 CAPSULE | Refills: 1 | Status: SHIPPED | OUTPATIENT
Start: 2025-06-24 | End: 2025-08-23

## 2025-06-24 ASSESSMENT — PAIN SCALES - GENERAL: PAINLEVEL_OUTOF10: NO PAIN (0)

## 2025-06-24 NOTE — PROGRESS NOTES
Chief Complaint: Follow Up (12 week follow up for incontinence and Nocturia associated with BPH )  .    HPI: Mr. Juan C Troncoso is a 75 year old year old male presenting today June 24, 2025 in follow up for evaluation of BPH with nocturia.    Patient was previously seen by me on 04/10/25. At that visit it was recommended that it was recommended that patient resume tamsulosin 0.4mg, limit alcohol, and have caffeine last at 9 am.    Patient reports he not been able to adhere to treatment recommendations. He had side effects of dizziness with tamsulosin. He put them in the cabinet and has not taken tamsulosin since April. Symptoms have been about the same since his last visit. He is getting up 10 times over night in a hour span. He notes he is up repeatedly between 3 am 5am. He is having 40 ounces of coffee daily and 4-5 beers daily. Stop caffeine and alcohol at  5 pm. Does drink water up to bedtime.     Past Medical History:   Diagnosis Date    Alcoholic cardiomyopathy (H)     Cardioverted.  Subsequently started on Amiodarone, Coumadin and Lisinopril.  Due for repeat echocardiography and cardiology follow-up 9/05.    Atrial fibrillation (H)     Atrial fibrillation possibly related to alcoholic cardiomyopathy.  Had a consultation with Dr. Henriquez on 02/28/05 and was advised to totally abstain from alcohol consumption.       Past Surgical History:   Procedure Laterality Date    ECHOCARDIOGRAM INTRAOPERATIVE IN OR      revealed minimal left ventricular hypertrophy without valvular disease.    OTHER SURGICAL HISTORY      208850,CHEST TUBE INSERTION,Previous chest tube placement    TONSILLECTOMY      No Comments Provided       Current Outpatient Medications   Medication Sig Dispense Refill    amLODIPine (NORVASC) 5 MG tablet Take 1 tablet (5 mg) by mouth daily. 90 tablet 4    carvedilol (COREG) 3.125 MG tablet Take 1 tablet (3.125 mg) by mouth every evening. + separate 6.25 mg tablet in morning 90 tablet 4    carvedilol  (COREG) 6.25 MG tablet Take 1 tablet (6.25 mg) by mouth every morning. + separate 3.125 mg tablet in evening 90 tablet 4    rosuvastatin (CRESTOR) 10 MG tablet Take 1 tablet (10 mg) by mouth daily. 90 tablet 4    tamsulosin (FLOMAX) 0.4 MG capsule Take 1 capsule (0.4 mg) by mouth daily. 90 capsule 3    warfarin ANTICOAGULANT (COUMADIN) 5 MG tablet Take daily as directed 200 tablet 4       ALLERGIES: Patient has no known allergies.     GENERAL PHYSICAL EXAM:   Vitals: /80 (BP Location: Right arm, Patient Position: Sitting, Cuff Size: Adult Large)   Pulse 80   Temp 97.9  F (36.6  C) (Temporal)   Resp 16   Wt 122.4 kg (269 lb 12.8 oz)   SpO2 94%   BMI 36.59 kg/m    Body mass index is 36.59 kg/m .    GENERAL: Well groomed, well developed, well nourished male in NAD.  ENT:  ENT exam normal  CV:  Warm extremities   RESPIRATORY: Normal respiratory effort.   GI:  Soft, NT, ND  MS: Moving all four  NEURO: Alert and oriented x 3.  PSYCH: Normal mood and affect, pleasant and agreeable during interview and exam.    :  Prostate deferred.     PVR: Residual urine by ultrasound was 192 ml.           RADIOLOGY: The following tests were reviewed: none available.    LABS: The last test results for Mr. Juan C Troncoso were reviewed:  Results for orders placed or performed in visit on 06/24/25 (from the past 24 hours)   Urinalysis Macroscopic   Result Value Ref Range    Color Urine Yellow Colorless, Straw, Light Yellow, Yellow    Appearance Urine Clear Clear    Glucose Urine Negative Negative mg/dL    Bilirubin Urine Negative Negative    Ketones Urine Negative Negative mg/dL    Specific Gravity Urine 1.006 1.000 - 1.030    Blood Urine Negative Negative    pH Urine 7.5 5.0 - 9.0    Protein Albumin Urine Negative Negative mg/dL    Urobilinogen Urine Normal Normal mg/dL    Nitrite Urine Negative Negative    Leukocyte Esterase Urine Negative Negative       PSA -   Lab Results   Component Value Date    PSA 1.51 03/26/2025    PSA  1.41 02/15/2024     BMP -   Recent Labs   Lab Test 03/26/25  1218 02/15/24  0845 05/07/19  0452 05/06/19  0720    137 139  --    POTASSIUM 4.3 4.4 3.9  --    CHLORIDE 100 103 110*  --    CO2 25 26 26  --    BUN 15.1 19.2 11  --    CR 0.79 0.98 0.68*  --    * 118* 125*  --    NOMAN 9.3 9.6 8.1*  --    MAG  --   --   --  1.6*       CBC -   Recent Labs   Lab Test 03/26/25  1218 02/15/24  0845 05/07/19  0452   WBC 6.2 6.7 6.6   HGB 15.9 15.0 8.1*    181 138*       ASSESSMENT:   BPH with nocturia.  This is likely influenced by his lifestyle given his caffeine and alcohol intake daily.  He also has a palpably large prostate.  No infections noted in the past.  Intermittent leakage.    PLAN:   1. Urinary incontinence, unspecified type (Primary)  I recommended he work on limiting bladder irritants, consider weight loss, and avoid constipation.  - MEASUREMENT, POST-VOIDING RESIDUAL URINE &/OR BLADDER CAPACITY, US, NON-IMAGING  - silodosin (RAPAFLO) 4 MG CAPS capsule; Take 1 capsule (4 mg) by mouth daily. Watch for dizziness with standing.  Dispense: 30 capsule; Refill: 1    2. Nocturia associated with benign prostatic hyperplasia  Discussion with patient about Benign Prostatic Hypertophy and enlargement of the male prostate with aging.  Explained the incidence of BPH which occurs in 40% of 40-year olds, 50% of 50-year olds, 60% of 60-year olds, etc.    Explained the importance of PSA testing and digital examination of the prostate (CHRISTINE).      Explained to patient that other conditions may mimic BPH including excessive caffeine consumption, alcohol consumption, constipation, spicy and acidic foods, stress, diabetes, obesity, and neurologic reasons such as Multiple Sclerosis and Parkinson's Disease.     Recommended behavioral therapy with reduction/elimination of caffeine and alcohol, certain foods/spices, avoidance of constipation and all cold and pain medications.      Discussed the use of alpha blockers  such as tamsulosin. Side effects of alpha blockers discussed including retrograde ejaculation, ED, dizziness upon standing, fatigue and nasal congestion.    Discussed finasteride and dutasteride which work by reducing the size of the prostate over 6 to 12 months.  Side effects including diminished libido(centrally), erectile dysfunction, breast tenderness and a < 0.5% risk of developing high grade prostate cancer.     Finally discussed surgical therapies such as TURP, TUIP, Holep, Urolift, Rezum and Robotic assisted Simple Prostatectomy and their associated risks and benefits.      I have discontinued tamsulosin off his list as he has not been using this.  I would like to try silodosin 4 mg capsules daily.  I considered alfuzosin however he is on carvedilol and there is a significant risk for drop in his blood pressure heart Best practice advisory warnings in epic.  He was advised that should he develop side effects from silodosin he should contact our office for alternative measures.  He is not interested in start of finasteride or cystoscopy to assess for outlet obstruction as he does not believe that he would have a TURP or other surgical intervention at this time.  I did recommend that he limit his sodium intake as he has some swelling to his lower extremities, however he has returned recently from [place] and has been eating a more dense diet and traveling.  Follow-up in 6 weeks to evaluate symptoms.    - MEASUREMENT, POST-VOIDING RESIDUAL URINE &/OR BLADDER CAPACITY, US, NON-IMAGING  - silodosin (RAPAFLO) 4 MG CAPS capsule; Take 1 capsule (4 mg) by mouth daily. Watch for dizziness with standing.  Dispense: 30 capsule; Refill: 1      19 minutes spent on the date of this encounter doing chart review, history and exam, documentation and further activities as noted above.      AYAN Davies [facility]

## 2025-06-24 NOTE — PATIENT INSTRUCTIONS
Drink plenty of fluids, > 2 liters/day  Avoid constipation.  Consider Miralax Over the counter, metamucil or Citrucel with increased fiber in your diet  Limit bladder irritants including alcohol, caffeine, and heavily seasoned foods.  Consider double-voiding with the second void in the tripod position.   Wear compression stockings and elevate legs during the day as able.  Limit fluids after 6 pm.  Limit sodium intake while legs are swollen.  I recommend you limit caffeine and alcohol after noon.  Start silodosin 4 mg daily at bedtime. Watch for dizziness with position changes.  Call if you have side effects from silodosin. [phone].  Follow up in 6 weeks.

## 2025-06-24 NOTE — NURSING NOTE
Chief Complaint   Patient presents with    Follow Up     12 week follow up for incontinence and Nocturia associated with BPH    Patient presents to the clinic today for a 12 week follow up for incontinence and Nocturia associated with Nocturia.  AUA 32   Post-Void Residual  A post-void residual was measured by ultrasonic bladder scanner.  192 mL  Diana Anne LPN  6/24/2025 8:06 AM      Initial There were no vitals taken for this visit. Estimated body mass index is 36.89 kg/m  as calculated from the following:    Height as of 4/1/25: 1.829 m (6').    Weight as of 4/1/25: 123.4 kg (272 lb).  Meds Reconciled: complete      Diana Anne LPN,LPN on 6/24/2025 at 7:51 AM  Ext. [phone]        Diana Anne LPN

## 2025-06-26 ENCOUNTER — ANTICOAGULATION THERAPY VISIT (OUTPATIENT)
Dept: ANTICOAGULATION | Facility: OTHER | Age: 75
End: 2025-06-26
Payer: COMMERCIAL

## 2025-06-26 LAB — INR POINT OF CARE: 2.6 (ref 0.9–1.1)

## 2025-06-26 PROCEDURE — 85610 PROTHROMBIN TIME: CPT | Mod: ZL,QW

## 2025-06-26 NOTE — PROGRESS NOTES
ANTICOAGULATION MANAGEMENT     Juan C Troncoso 75 year old male is on warfarin with therapeutic INR result. (Goal INR 2.0-3.0)    Recent labs: (last 7 days)     06/26/25  0810   INR 2.6*       ASSESSMENT     Source(s): Chart Review and In person     Warfarin doses taken: Warfarin taken as instructed  Diet: No new diet changes identified  Medication/supplement changes: None noted  New illness, injury, or hospitalization: No  Signs or symptoms of bleeding or clotting: No  Previous result: Subtherapeutic  Additional findings: None       PLAN     Recommended plan for no diet, medication or health factor changes affecting INR     Dosing Instructions: Continue your current warfarin dose with next INR in 4 weeks       Summary  As of 6/26/2025      Full warfarin instructions:  5 mg every Wed; 7.5 mg all other days   Next INR check:  7/24/2025               In person    Lab visit scheduled    Education provided: Please call back if any changes to your diet, medications or how you've been taking warfarin    Plan made per [facility] anticoagulation protocol    Amanda Bangura RN  6/26/2025  Anticoagulation Clinic  [employer] for routing messages: p  ANTICOAGULATION NURSE  [facility] patient phone line: [phone]        _______________________________________________________________________     Anticoagulation Episode Summary       Current INR goal:  2.0-3.0   TTR:  76.8% (1 y)   Target end date:  Indefinite   Send INR reminders to:   ANTICOAGULATION NURSE       Comments:  --             Anticoagulation Care Providers       Provider Role Specialty Phone number    Kayode Mcgowan MD Referring Family Medicine [phone]

## 2025-07-24 ENCOUNTER — ANTICOAGULATION THERAPY VISIT (OUTPATIENT)
Dept: ANTICOAGULATION | Facility: OTHER | Age: 75
End: 2025-07-24
Attending: INTERNAL MEDICINE
Payer: COMMERCIAL

## 2025-07-24 LAB — INR POINT OF CARE: 2.1 (ref 0.9–1.1)

## 2025-07-24 PROCEDURE — 85610 PROTHROMBIN TIME: CPT | Mod: ZL,QW

## 2025-07-24 NOTE — PROGRESS NOTES
"ANTICOAGULATION MANAGEMENT     Juan C Troncoso 75 year old male is on warfarin with therapeutic INR result. (Goal INR 2.0-3.0)    No results for input(s): \"INR\" in the last 168 hours.    ASSESSMENT     Source(s): Chart Review and in person     Warfarin doses taken: Warfarin taken as instructed  Diet: No new diet changes identified  Medication/supplement changes: None noted  New illness, injury, or hospitalization: No  Signs or symptoms of bleeding or clotting: No  Previous result: Therapeutic last visit; previously outside of goal range  Additional findings: None       PLAN     Recommended plan for no diet, medication or health factor changes affecting INR     Dosing Instructions: Continue your current warfarin dose with next INR in 5 weeks       Summary  As of 7/24/2025      Full warfarin instructions:  5 mg every Wed; 7.5 mg all other days   Next INR check:  8/28/2025               In person    Lab visit scheduled    Education provided: Please call back if any changes to your diet, medications or how you've been taking warfarin    Plan made per [facility] anticoagulation protocol    Love Silva RN  7/24/2025  Anticoagulation Clinic  [employer] for routing messages: lucie  ANTICOAGULATION NURSE  [facility] patient phone line: [phone]        _______________________________________________________________________     Anticoagulation Episode Summary       Current INR goal:  2.0-3.0   TTR:  82.2% (1 y)   Target end date:  Indefinite   Send INR reminders to:   ANTICOAGULATION NURSE       Comments:  --             Anticoagulation Care Providers       Provider Role Specialty Phone number    Kayode Mcgowan MD Referring Family Medicine [phone]              "

## 2025-08-28 ENCOUNTER — ANTICOAGULATION THERAPY VISIT (OUTPATIENT)
Dept: ANTICOAGULATION | Facility: OTHER | Age: 75
End: 2025-08-28
Attending: INTERNAL MEDICINE
Payer: COMMERCIAL

## 2025-08-28 LAB — INR POINT OF CARE: 2.5 (ref 0.9–1.1)

## 2025-08-28 PROCEDURE — 85610 PROTHROMBIN TIME: CPT | Mod: ZL,QW

## (undated) RX ORDER — PANTOPRAZOLE SODIUM 40 MG/10ML
INJECTION, POWDER, LYOPHILIZED, FOR SOLUTION INTRAVENOUS
Status: DISPENSED
Start: 2019-05-05

## (undated) RX ORDER — SODIUM CHLORIDE 9 MG/ML
INJECTION, SOLUTION INTRAVENOUS
Status: DISPENSED
Start: 2019-05-05